# Patient Record
Sex: MALE | Race: WHITE | Employment: FULL TIME | URBAN - METROPOLITAN AREA
[De-identification: names, ages, dates, MRNs, and addresses within clinical notes are randomized per-mention and may not be internally consistent; named-entity substitution may affect disease eponyms.]

---

## 2018-01-13 ENCOUNTER — HOSPITAL ENCOUNTER (EMERGENCY)
Age: 28
Discharge: HOME | End: 2018-01-13
Attending: EMERGENCY MEDICINE
Payer: COMMERCIAL

## 2018-01-13 VITALS
SYSTOLIC BLOOD PRESSURE: 159 MMHG | BODY MASS INDEX: 38.32 KG/M2 | TEMPERATURE: 98.4 F | OXYGEN SATURATION: 98 % | RESPIRATION RATE: 12 BRPM | DIASTOLIC BLOOD PRESSURE: 71 MMHG | HEIGHT: 65 IN | HEART RATE: 71 BPM | WEIGHT: 230 LBS

## 2018-01-13 DIAGNOSIS — M43.6 TORTICOLLIS: Primary | ICD-10-CM

## 2018-01-13 PROCEDURE — 6360000002 HC RX W HCPCS: Performed by: PHYSICIAN ASSISTANT

## 2018-01-13 PROCEDURE — 99282 EMERGENCY DEPT VISIT SF MDM: CPT

## 2018-01-13 PROCEDURE — 96372 THER/PROPH/DIAG INJ SC/IM: CPT

## 2018-01-13 RX ORDER — KETOROLAC TROMETHAMINE 30 MG/ML
60 INJECTION, SOLUTION INTRAMUSCULAR; INTRAVENOUS ONCE
Status: COMPLETED | OUTPATIENT
Start: 2018-01-13 | End: 2018-01-13

## 2018-01-13 RX ORDER — ORPHENADRINE CITRATE 30 MG/ML
60 INJECTION INTRAMUSCULAR; INTRAVENOUS ONCE
Status: COMPLETED | OUTPATIENT
Start: 2018-01-13 | End: 2018-01-13

## 2018-01-13 RX ORDER — METHYLPREDNISOLONE ACETATE 80 MG/ML
80 INJECTION, SUSPENSION INTRA-ARTICULAR; INTRALESIONAL; INTRAMUSCULAR; SOFT TISSUE ONCE
Status: COMPLETED | OUTPATIENT
Start: 2018-01-13 | End: 2018-01-13

## 2018-01-13 RX ORDER — DIAZEPAM 5 MG/1
5 TABLET ORAL EVERY 8 HOURS PRN
Qty: 10 TABLET | Refills: 0 | Status: SHIPPED | OUTPATIENT
Start: 2018-01-13 | End: 2018-01-16

## 2018-01-13 RX ORDER — ETODOLAC 400 MG/1
400 TABLET, FILM COATED ORAL 2 TIMES DAILY
Qty: 14 TABLET | Refills: 0 | Status: SHIPPED | OUTPATIENT
Start: 2018-01-13 | End: 2019-04-28

## 2018-01-13 RX ADMIN — KETOROLAC TROMETHAMINE 60 MG: 30 INJECTION, SOLUTION INTRAMUSCULAR at 19:20

## 2018-01-13 RX ADMIN — ORPHENADRINE CITRATE 60 MG: 30 INJECTION INTRAMUSCULAR; INTRAVENOUS at 19:19

## 2018-01-13 RX ADMIN — METHYLPREDNISOLONE ACETATE 80 MG: 80 INJECTION, SUSPENSION INTRA-ARTICULAR; INTRALESIONAL; INTRAMUSCULAR; SOFT TISSUE at 19:20

## 2018-01-13 ASSESSMENT — ENCOUNTER SYMPTOMS
ALLERGIC/IMMUNOLOGIC NEGATIVE: 1
SHORTNESS OF BREATH: 0
TROUBLE SWALLOWING: 0
ABDOMINAL PAIN: 0
COLOR CHANGE: 0
EYE PAIN: 0
APNEA: 0

## 2018-01-13 ASSESSMENT — PAIN DESCRIPTION - FREQUENCY: FREQUENCY: CONTINUOUS

## 2018-01-13 ASSESSMENT — PAIN SCALES - GENERAL
PAINLEVEL_OUTOF10: 7
PAINLEVEL_OUTOF10: 7

## 2018-01-13 ASSESSMENT — PAIN DESCRIPTION - LOCATION: LOCATION: NECK

## 2018-01-13 ASSESSMENT — PAIN DESCRIPTION - ORIENTATION: ORIENTATION: LEFT

## 2018-01-13 ASSESSMENT — PAIN DESCRIPTION - DESCRIPTORS: DESCRIPTORS: BURNING;SQUEEZING;THROBBING

## 2018-01-13 ASSESSMENT — PAIN DESCRIPTION - ONSET: ONSET: AWAKENED FROM SLEEP

## 2018-01-13 ASSESSMENT — PAIN DESCRIPTION - PROGRESSION: CLINICAL_PROGRESSION: NOT CHANGED

## 2018-01-13 ASSESSMENT — PAIN DESCRIPTION - PAIN TYPE: TYPE: ACUTE PAIN

## 2018-01-14 NOTE — ED PROVIDER NOTES
[address] [facility] ED  eMERGENCY dEPARTMENT eNCOUnter      Pt Name: Marbella Paez  MRN: [id number]  [place] 1990  Date of evaluation: 1/13/2018  Provider: Luc Mendez PA-C    CHIEF COMPLAINT       Chief Complaint   Patient presents with    Neck Pain     pt woke up with left neck pain 2 days ago that radiates down left arm and to left shoulder blade. No injury         HISTORY OF PRESENT ILLNESS  (Location/Symptom, Timing/Onset, Context/Setting, Quality, Duration, Modifying Factors, Severity.)   Capri Moura II is a 32 y.o. male who presents to the emergency department With complaints of left-sided neck pain, shoulder and trapezius pain that began after sleeping wrong on it 2 days ago. Patient sits with his head cocked slightly to the right and states that he has pain when he turns towards the ipsilateral side on the left. Patient denies any numbness or tingling. Patient denies any chest pain or shortness of breath. Patient denies any headaches, dizziness, lightheadedness, change or loss of vision or hearing. Patient denies any abdominal pain, nausea vomiting or diarrhea. Patient denies any fevers constitutional symptoms otherwise. Patient denies any falls or blunt trauma. HPI    Nursing Notes were reviewed and I agree. REVIEW OF SYSTEMS    (2-9 systems for level 4, 10 or more for level 5)     Review of Systems   Constitutional: Negative for diaphoresis and fever. HENT: Negative for hearing loss and trouble swallowing. Eyes: Negative for pain. Respiratory: Negative for apnea and shortness of breath. Cardiovascular: Negative for chest pain. Gastrointestinal: Negative for abdominal pain. Endocrine: Negative. Genitourinary: Negative for hematuria. Musculoskeletal: Positive for neck pain and neck stiffness. Skin: Negative for color change. Allergic/Immunologic: Negative. Neurological: Negative for dizziness and numbness. Hematological: Negative. in 2 days        DISCHARGE MEDICATIONS:  New Prescriptions    DIAZEPAM (VALIUM) 5 MG TABLET    Take 1 tablet by mouth every 8 hours as needed (muscle spasm) for up to 3 days.     ETODOLAC (LODINE) 400 MG TABLET    Take 1 tablet by mouth 2 times daily       (Please note that portions of this note were completed with a voice recognition program.  Efforts were made to edit the dictations but occasionally words are mis-transcribed.)    LAUREANO Camilo PA-C  01/13/18 1919

## 2018-03-30 ENCOUNTER — HOSPITAL ENCOUNTER (EMERGENCY)
Age: 28
Discharge: HOME | End: 2018-03-30

## 2018-03-30 VITALS
OXYGEN SATURATION: 99 % | RESPIRATION RATE: 18 BRPM | HEIGHT: 65 IN | HEART RATE: 82 BPM | DIASTOLIC BLOOD PRESSURE: 84 MMHG | TEMPERATURE: 98.8 F | WEIGHT: 230 LBS | SYSTOLIC BLOOD PRESSURE: 142 MMHG | BODY MASS INDEX: 38.32 KG/M2

## 2018-03-30 DIAGNOSIS — H00.014 HORDEOLUM EXTERNUM OF LEFT UPPER EYELID: Primary | ICD-10-CM

## 2018-03-30 PROCEDURE — 99282 EMERGENCY DEPT VISIT SF MDM: CPT

## 2018-03-30 RX ORDER — CEPHALEXIN 500 MG/1
500 CAPSULE ORAL 2 TIMES DAILY
Qty: 14 CAPSULE | Refills: 0 | Status: SHIPPED | OUTPATIENT
Start: 2018-03-30 | End: 2018-04-06

## 2018-03-30 RX ORDER — LISINOPRIL 20 MG/1
5 TABLET ORAL DAILY
COMMUNITY
End: 2019-01-17

## 2018-03-30 ASSESSMENT — PAIN DESCRIPTION - ORIENTATION: ORIENTATION: LEFT

## 2018-03-30 ASSESSMENT — ENCOUNTER SYMPTOMS
ABDOMINAL PAIN: 0
DIARRHEA: 0
COUGH: 0
VOMITING: 0
SHORTNESS OF BREATH: 0
NAUSEA: 0

## 2018-03-30 ASSESSMENT — PAIN DESCRIPTION - LOCATION: LOCATION: EYE;FACE

## 2018-03-30 ASSESSMENT — PAIN DESCRIPTION - PAIN TYPE: TYPE: ACUTE PAIN

## 2018-10-20 ENCOUNTER — HOSPITAL ENCOUNTER (EMERGENCY)
Age: 28
Discharge: HOME | End: 2018-10-20
Attending: STUDENT IN AN ORGANIZED HEALTH CARE EDUCATION/TRAINING PROGRAM
Payer: COMMERCIAL

## 2018-10-20 ENCOUNTER — APPOINTMENT (OUTPATIENT)
Dept: GENERAL RADIOLOGY | Age: 28
End: 2018-10-20
Payer: COMMERCIAL

## 2018-10-20 VITALS
HEART RATE: 90 BPM | RESPIRATION RATE: 14 BRPM | WEIGHT: 215 LBS | DIASTOLIC BLOOD PRESSURE: 68 MMHG | SYSTOLIC BLOOD PRESSURE: 135 MMHG | TEMPERATURE: 99 F | BODY MASS INDEX: 35.78 KG/M2 | OXYGEN SATURATION: 99 %

## 2018-10-20 DIAGNOSIS — R07.9 CHEST PAIN, UNSPECIFIED TYPE: Primary | ICD-10-CM

## 2018-10-20 LAB
ALBUMIN SERPL-MCNC: 4.5 G/DL (ref 3.9–4.9)
ALP BLD-CCNC: 61 U/L (ref 35–104)
ALT SERPL-CCNC: 17 U/L (ref 0–41)
AMPHETAMINE SCREEN, URINE: NORMAL
ANION GAP SERPL CALCULATED.3IONS-SCNC: 14 MEQ/L (ref 7–13)
AST SERPL-CCNC: 16 U/L (ref 0–40)
BARBITURATE SCREEN URINE: NORMAL
BASOPHILS ABSOLUTE: 0 K/UL (ref 0–0.2)
BASOPHILS RELATIVE PERCENT: 0.4 %
BENZODIAZEPINE SCREEN, URINE: NORMAL
BILIRUB SERPL-MCNC: 0.3 MG/DL (ref 0–1.2)
BUN BLDV-MCNC: 13 MG/DL (ref 6–20)
CALCIUM SERPL-MCNC: 9.2 MG/DL (ref 8.6–10.2)
CANNABINOID SCREEN URINE: NORMAL
CHLORIDE BLD-SCNC: 100 MEQ/L (ref 98–107)
CO2: 23 MEQ/L (ref 22–29)
COCAINE METABOLITE SCREEN URINE: NORMAL
CREAT SERPL-MCNC: 0.84 MG/DL (ref 0.7–1.2)
EKG ATRIAL RATE: 98 BPM
EKG P AXIS: 40 DEGREES
EKG P-R INTERVAL: 142 MS
EKG Q-T INTERVAL: 360 MS
EKG QRS DURATION: 110 MS
EKG QTC CALCULATION (BAZETT): 459 MS
EKG R AXIS: 34 DEGREES
EKG T AXIS: 14 DEGREES
EKG VENTRICULAR RATE: 98 BPM
EOSINOPHILS ABSOLUTE: 0.3 K/UL (ref 0–0.7)
EOSINOPHILS RELATIVE PERCENT: 2.6 %
ETHANOL PERCENT: NORMAL G/DL
ETHANOL: <10 MG/DL (ref 0–0.08)
GFR AFRICAN AMERICAN: >60
GFR NON-AFRICAN AMERICAN: >60
GLOBULIN: 3.7 G/DL (ref 2.3–3.5)
GLUCOSE BLD-MCNC: 132 MG/DL (ref 74–109)
HCT VFR BLD CALC: 45.9 % (ref 42–52)
HEMOGLOBIN: 15.2 G/DL (ref 14–18)
LYMPHOCYTES ABSOLUTE: 2.6 K/UL (ref 1–4.8)
LYMPHOCYTES RELATIVE PERCENT: 24.5 %
Lab: NORMAL
MCH RBC QN AUTO: 27 PG (ref 27–31.3)
MCHC RBC AUTO-ENTMCNC: 33.1 % (ref 33–37)
MCV RBC AUTO: 81.4 FL (ref 80–100)
MONOCYTES ABSOLUTE: 1.2 K/UL (ref 0.2–0.8)
MONOCYTES RELATIVE PERCENT: 11.6 %
NEUTROPHILS ABSOLUTE: 6.5 K/UL (ref 1.4–6.5)
NEUTROPHILS RELATIVE PERCENT: 60.9 %
OPIATE SCREEN URINE: NORMAL
PDW BLD-RTO: 13.7 % (ref 11.5–14.5)
PHENCYCLIDINE SCREEN URINE: NORMAL
PLATELET # BLD: 330 K/UL (ref 130–400)
POTASSIUM SERPL-SCNC: 3.5 MEQ/L (ref 3.5–5.1)
RBC # BLD: 5.64 M/UL (ref 4.7–6.1)
SODIUM BLD-SCNC: 137 MEQ/L (ref 132–144)
TOTAL CK: 135 U/L (ref 0–190)
TOTAL PROTEIN: 8.2 G/DL (ref 6.4–8.1)
TROPONIN: <0.01 NG/ML (ref 0–0.01)
WBC # BLD: 10.7 K/UL (ref 4.8–10.8)

## 2018-10-20 PROCEDURE — G0480 DRUG TEST DEF 1-7 CLASSES: HCPCS

## 2018-10-20 PROCEDURE — 82550 ASSAY OF CK (CPK): CPT

## 2018-10-20 PROCEDURE — 80053 COMPREHEN METABOLIC PANEL: CPT

## 2018-10-20 PROCEDURE — 99285 EMERGENCY DEPT VISIT HI MDM: CPT

## 2018-10-20 PROCEDURE — 85025 COMPLETE CBC W/AUTO DIFF WBC: CPT

## 2018-10-20 PROCEDURE — 84484 ASSAY OF TROPONIN QUANT: CPT

## 2018-10-20 PROCEDURE — 71045 X-RAY EXAM CHEST 1 VIEW: CPT

## 2018-10-20 PROCEDURE — 80307 DRUG TEST PRSMV CHEM ANLYZR: CPT

## 2018-10-20 PROCEDURE — 93005 ELECTROCARDIOGRAM TRACING: CPT

## 2018-10-20 PROCEDURE — 36415 COLL VENOUS BLD VENIPUNCTURE: CPT

## 2018-10-20 ASSESSMENT — ENCOUNTER SYMPTOMS
CONSTIPATION: 0
VOMITING: 0
ABDOMINAL PAIN: 0
COLOR CHANGE: 0
SHORTNESS OF BREATH: 0
RHINORRHEA: 0
EYE DISCHARGE: 0
SORE THROAT: 0
DIARRHEA: 0
ABDOMINAL DISTENTION: 0
NAUSEA: 0

## 2018-10-20 NOTE — ED PROVIDER NOTES
[address] [facility] ED  eMERGENCY dEPARTMENT eNCOUnter      Pt Name: Hannah Elliott  MRN: [id number]  [place] 1990  Date of evaluation: 10/20/2018  Provider: Benja Lennon PA-C    CHIEF COMPLAINT       Chief Complaint   Patient presents with    Chest Pain         HISTORY OF PRESENT ILLNESS   (Location/Symptom, Timing/Onset,Context/Setting, Quality, Duration, Modifying Factors, Severity)  Note limiting factors. Hannah Elliott is a 29 y.o. male who presents to the emergency department with a complaint of midsternal chest pain which patient states started approximately 1 hour ago while he was shaving. He denies any shortness of breath no nausea vomiting no dizziness with associated pain states initially as a 3 out of 10 and felt like a heaviness on his chest but states prior to calling EMS his symptoms did resolve. Patient states past history of coronary artery disease with aortic stenosis which was repaired as a child she also has a history of hypertension and diabetes states that he's been off his medications for over one year due to lack of insurance. He states he does not know what his blood sugars are doing as he does not ever checked out. He states he is currently pain-free at this time 0 out of 10. HPI    NursingNotes were reviewed. REVIEW OF SYSTEMS    (2-9 systems for level 4, 10 or more for level 5)     Review of Systems   Constitutional: Negative for activity change and appetite change. HENT: Negative for congestion, ear discharge, ear pain, nosebleeds, rhinorrhea and sore throat. Eyes: Negative for discharge. Respiratory: Negative for shortness of breath. Cardiovascular: Positive for chest pain. Negative for palpitations and leg swelling. Gastrointestinal: Negative for abdominal distention, abdominal pain, constipation, diarrhea, nausea and vomiting. Genitourinary: Negative for difficulty urinating, dysuria, frequency and urgency.    Musculoskeletal: Negative for arthralgias. Skin: Negative for color change, pallor and rash. Neurological: Negative for dizziness, tremors, syncope, weakness, numbness and headaches. Psychiatric/Behavioral: Negative for agitation and confusion. Except as noted above the remainder of the review of systems was reviewed and negative. PAST MEDICAL HISTORY     Past Medical History:   Diagnosis Date    CAD (coronary artery disease)     Diabetes mellitus ([facility]     Hypertension          SURGICALHISTORY       Past Surgical History:   Procedure Laterality Date    AORTA SURGERY      x3         CURRENT MEDICATIONS       Discharge Medication List as of 10/20/2018  3:46 PM      CONTINUE these medications which have NOT CHANGED    Details   lisinopril (PRINIVIL;ZESTRIL) 20 MG tablet Take 20 mg by mouth dailyHistorical Med      metFORMIN (GLUCOPHAGE) 500 MG tablet Take 500 mg by mouth daily (with breakfast)Historical Med      etodolac (LODINE) 400 MG tablet Take 1 tablet by mouth 2 times daily, Disp-14 tablet, R-0Print      NITROGLYCERIN SL Place  under the tongue as needed. Pantoprazole Sodium (PROTONIX PO) Take  by mouth. ALLERGIES     Patient has no known allergies. FAMILY HISTORY     History reviewed. No pertinent family history.        SOCIAL HISTORY       Social History     Social History    Marital status: Single     Spouse name: N/A    Number of children: N/A    Years of education: N/A     Social History Main Topics    Smoking status: Never Smoker    Smokeless tobacco: Never Used    Alcohol use No    Drug use: No    Sexual activity: Not Asked     Other Topics Concern    None     Social History Narrative    None       SCREENINGS      @FLOW([id number])@      PHYSICAL EXAM    (up to 7 for level 4, 8 or more for level 5)     ED Triage Vitals [10/20/18 1353]   BP Temp Temp Source Pulse Resp SpO2 Height Weight   (!) 144/72 99 °F (37.2 °C) Oral 106 22 99 % -- 215 lb (97.5 kg)       Physical Exam

## 2018-10-22 PROCEDURE — 93010 ELECTROCARDIOGRAM REPORT: CPT | Performed by: INTERNAL MEDICINE

## 2018-10-25 ASSESSMENT — HEART SCORE: ECG: 1

## 2019-01-17 ENCOUNTER — HOSPITAL ENCOUNTER (EMERGENCY)
Age: 29
Discharge: HOME | End: 2019-01-17
Payer: MEDICAID

## 2019-01-17 VITALS
HEART RATE: 81 BPM | RESPIRATION RATE: 19 BRPM | SYSTOLIC BLOOD PRESSURE: 132 MMHG | WEIGHT: 206 LBS | HEIGHT: 66 IN | DIASTOLIC BLOOD PRESSURE: 61 MMHG | TEMPERATURE: 98.6 F | BODY MASS INDEX: 33.11 KG/M2 | OXYGEN SATURATION: 99 %

## 2019-01-17 DIAGNOSIS — H60.393 INFECTIVE OTITIS EXTERNA OF BOTH EARS: ICD-10-CM

## 2019-01-17 DIAGNOSIS — Z76.0 ENCOUNTER FOR MEDICATION REFILL: ICD-10-CM

## 2019-01-17 DIAGNOSIS — H81.10 BENIGN PAROXYSMAL POSITIONAL VERTIGO, UNSPECIFIED LATERALITY: Primary | ICD-10-CM

## 2019-01-17 DIAGNOSIS — H61.23 BILATERAL IMPACTED CERUMEN: ICD-10-CM

## 2019-01-17 LAB
ALBUMIN SERPL-MCNC: 4.6 G/DL (ref 3.9–4.9)
ALP BLD-CCNC: 71 U/L (ref 35–104)
ALT SERPL-CCNC: 14 U/L (ref 0–41)
ANION GAP SERPL CALCULATED.3IONS-SCNC: 13 MEQ/L (ref 7–13)
AST SERPL-CCNC: 15 U/L (ref 0–40)
BASOPHILS ABSOLUTE: 0.1 K/UL (ref 0–0.2)
BASOPHILS RELATIVE PERCENT: 0.6 %
BILIRUB SERPL-MCNC: <0.2 MG/DL (ref 0–1.2)
BUN BLDV-MCNC: 17 MG/DL (ref 6–20)
CALCIUM SERPL-MCNC: 9.6 MG/DL (ref 8.6–10.2)
CHLORIDE BLD-SCNC: 101 MEQ/L (ref 98–107)
CO2: 25 MEQ/L (ref 22–29)
CREAT SERPL-MCNC: 0.84 MG/DL (ref 0.7–1.2)
EKG ATRIAL RATE: 77 BPM
EKG P AXIS: 32 DEGREES
EKG P-R INTERVAL: 150 MS
EKG Q-T INTERVAL: 380 MS
EKG QRS DURATION: 106 MS
EKG QTC CALCULATION (BAZETT): 430 MS
EKG R AXIS: 30 DEGREES
EKG T AXIS: 30 DEGREES
EKG VENTRICULAR RATE: 77 BPM
EOSINOPHILS ABSOLUTE: 0.5 K/UL (ref 0–0.7)
EOSINOPHILS RELATIVE PERCENT: 3.3 %
GFR AFRICAN AMERICAN: >60
GFR NON-AFRICAN AMERICAN: >60
GLOBULIN: 3.8 G/DL (ref 2.3–3.5)
GLUCOSE BLD-MCNC: 108 MG/DL (ref 74–109)
GLUCOSE BLD-MCNC: 120 MG/DL (ref 60–115)
HCT VFR BLD CALC: 46.5 % (ref 42–52)
HEMOGLOBIN: 15.8 G/DL (ref 14–18)
LYMPHOCYTES ABSOLUTE: 3.4 K/UL (ref 1–4.8)
LYMPHOCYTES RELATIVE PERCENT: 22.8 %
MCH RBC QN AUTO: 27.3 PG (ref 27–31.3)
MCHC RBC AUTO-ENTMCNC: 34 % (ref 33–37)
MCV RBC AUTO: 80.4 FL (ref 80–100)
MONOCYTES ABSOLUTE: 1.4 K/UL (ref 0.2–0.8)
MONOCYTES RELATIVE PERCENT: 9.1 %
NEUTROPHILS ABSOLUTE: 9.6 K/UL (ref 1.4–6.5)
NEUTROPHILS RELATIVE PERCENT: 64.2 %
PDW BLD-RTO: 14.7 % (ref 11.5–14.5)
PERFORMED ON: ABNORMAL
PLATELET # BLD: 331 K/UL (ref 130–400)
POTASSIUM SERPL-SCNC: 4.1 MEQ/L (ref 3.5–5.1)
RBC # BLD: 5.78 M/UL (ref 4.7–6.1)
SODIUM BLD-SCNC: 139 MEQ/L (ref 132–144)
TOTAL PROTEIN: 8.4 G/DL (ref 6.4–8.1)
WBC # BLD: 14.9 K/UL (ref 4.8–10.8)

## 2019-01-17 PROCEDURE — 99284 EMERGENCY DEPT VISIT MOD MDM: CPT

## 2019-01-17 PROCEDURE — 93005 ELECTROCARDIOGRAM TRACING: CPT

## 2019-01-17 PROCEDURE — 2580000003 HC RX 258: Performed by: NURSE PRACTITIONER

## 2019-01-17 PROCEDURE — 85025 COMPLETE CBC W/AUTO DIFF WBC: CPT

## 2019-01-17 PROCEDURE — 6370000000 HC RX 637 (ALT 250 FOR IP): Performed by: NURSE PRACTITIONER

## 2019-01-17 PROCEDURE — 36415 COLL VENOUS BLD VENIPUNCTURE: CPT

## 2019-01-17 PROCEDURE — 80053 COMPREHEN METABOLIC PANEL: CPT

## 2019-01-17 RX ORDER — OFLOXACIN 3 MG/ML
10 SOLUTION AURICULAR (OTIC) 2 TIMES DAILY
Qty: 1 BOTTLE | Refills: 0 | Status: SHIPPED | OUTPATIENT
Start: 2019-01-17 | End: 2019-01-27

## 2019-01-17 RX ORDER — 0.9 % SODIUM CHLORIDE 0.9 %
1000 INTRAVENOUS SOLUTION INTRAVENOUS ONCE
Status: COMPLETED | OUTPATIENT
Start: 2019-01-17 | End: 2019-01-17

## 2019-01-17 RX ORDER — LISINOPRIL 5 MG/1
5 TABLET ORAL DAILY
Qty: 30 TABLET | Refills: 0 | Status: SHIPPED | OUTPATIENT
Start: 2019-01-17 | End: 2019-06-13 | Stop reason: SDUPTHER

## 2019-01-17 RX ORDER — ATORVASTATIN CALCIUM 20 MG/1
20 TABLET, FILM COATED ORAL DAILY
Qty: 30 TABLET | Refills: 0 | Status: SHIPPED | OUTPATIENT
Start: 2019-01-17 | End: 2019-06-13 | Stop reason: SDUPTHER

## 2019-01-17 RX ORDER — MECLIZINE HYDROCHLORIDE 25 MG/1
25 TABLET ORAL EVERY 8 HOURS PRN
Qty: 15 TABLET | Refills: 0 | Status: SHIPPED | OUTPATIENT
Start: 2019-01-17 | End: 2019-01-22

## 2019-01-17 RX ORDER — MECLIZINE HYDROCHLORIDE 25 MG/1
25 TABLET ORAL ONCE
Status: COMPLETED | OUTPATIENT
Start: 2019-01-17 | End: 2019-01-17

## 2019-01-17 RX ADMIN — MECLIZINE HYDROCHLORIDE 25 MG: 25 TABLET ORAL at 21:12

## 2019-01-17 RX ADMIN — SODIUM CHLORIDE 1000 ML: 9 INJECTION, SOLUTION INTRAVENOUS at 20:28

## 2019-01-17 ASSESSMENT — ENCOUNTER SYMPTOMS
TROUBLE SWALLOWING: 0
SORE THROAT: 0
BACK PAIN: 0
WHEEZING: 0
ABDOMINAL PAIN: 0
NAUSEA: 0
VOMITING: 0
DIARRHEA: 0
SHORTNESS OF BREATH: 0
EYE ITCHING: 0
VOICE CHANGE: 0
RHINORRHEA: 0
EYE PAIN: 0
COLOR CHANGE: 0
COUGH: 0
EYE REDNESS: 0

## 2019-01-30 ENCOUNTER — HOSPITAL ENCOUNTER (EMERGENCY)
Age: 29
Discharge: HOME | End: 2019-01-30
Payer: MEDICAID

## 2019-01-30 VITALS
OXYGEN SATURATION: 100 % | WEIGHT: 206 LBS | HEART RATE: 67 BPM | BODY MASS INDEX: 34.32 KG/M2 | RESPIRATION RATE: 16 BRPM | SYSTOLIC BLOOD PRESSURE: 109 MMHG | TEMPERATURE: 98.2 F | HEIGHT: 65 IN | DIASTOLIC BLOOD PRESSURE: 61 MMHG

## 2019-01-30 DIAGNOSIS — R51.9 NONINTRACTABLE HEADACHE, UNSPECIFIED CHRONICITY PATTERN, UNSPECIFIED HEADACHE TYPE: Primary | ICD-10-CM

## 2019-01-30 LAB
ANION GAP SERPL CALCULATED.3IONS-SCNC: 10 MEQ/L (ref 7–13)
BASOPHILS ABSOLUTE: 0.1 K/UL (ref 0–0.2)
BASOPHILS RELATIVE PERCENT: 0.6 %
BUN BLDV-MCNC: 10 MG/DL (ref 6–20)
CALCIUM SERPL-MCNC: 9.2 MG/DL (ref 8.6–10.2)
CHLORIDE BLD-SCNC: 101 MEQ/L (ref 98–107)
CO2: 29 MEQ/L (ref 22–29)
CREAT SERPL-MCNC: 0.9 MG/DL (ref 0.7–1.2)
EOSINOPHILS ABSOLUTE: 0.2 K/UL (ref 0–0.7)
EOSINOPHILS RELATIVE PERCENT: 1.9 %
GFR AFRICAN AMERICAN: >60
GFR NON-AFRICAN AMERICAN: >60
GLUCOSE BLD-MCNC: 141 MG/DL (ref 74–109)
HCT VFR BLD CALC: 43.1 % (ref 42–52)
HEMOGLOBIN: 15.1 G/DL (ref 14–18)
LYMPHOCYTES ABSOLUTE: 2.2 K/UL (ref 1–4.8)
LYMPHOCYTES RELATIVE PERCENT: 21.8 %
MCH RBC QN AUTO: 28 PG (ref 27–31.3)
MCHC RBC AUTO-ENTMCNC: 35.1 % (ref 33–37)
MCV RBC AUTO: 79.8 FL (ref 80–100)
MONOCYTES ABSOLUTE: 1 K/UL (ref 0.2–0.8)
MONOCYTES RELATIVE PERCENT: 9.9 %
NEUTROPHILS ABSOLUTE: 6.5 K/UL (ref 1.4–6.5)
NEUTROPHILS RELATIVE PERCENT: 65.8 %
PDW BLD-RTO: 14.6 % (ref 11.5–14.5)
PLATELET # BLD: 296 K/UL (ref 130–400)
POTASSIUM SERPL-SCNC: 4.1 MEQ/L (ref 3.5–5.1)
RBC # BLD: 5.4 M/UL (ref 4.7–6.1)
SODIUM BLD-SCNC: 140 MEQ/L (ref 132–144)
WBC # BLD: 9.9 K/UL (ref 4.8–10.8)

## 2019-01-30 PROCEDURE — 96374 THER/PROPH/DIAG INJ IV PUSH: CPT

## 2019-01-30 PROCEDURE — 6360000002 HC RX W HCPCS: Performed by: NURSE PRACTITIONER

## 2019-01-30 PROCEDURE — 2580000003 HC RX 258: Performed by: NURSE PRACTITIONER

## 2019-01-30 PROCEDURE — 85025 COMPLETE CBC W/AUTO DIFF WBC: CPT

## 2019-01-30 PROCEDURE — 96375 TX/PRO/DX INJ NEW DRUG ADDON: CPT

## 2019-01-30 PROCEDURE — 80048 BASIC METABOLIC PNL TOTAL CA: CPT

## 2019-01-30 PROCEDURE — 36415 COLL VENOUS BLD VENIPUNCTURE: CPT

## 2019-01-30 PROCEDURE — 99284 EMERGENCY DEPT VISIT MOD MDM: CPT

## 2019-01-30 RX ORDER — METOCLOPRAMIDE HYDROCHLORIDE 5 MG/ML
10 INJECTION INTRAMUSCULAR; INTRAVENOUS ONCE
Status: COMPLETED | OUTPATIENT
Start: 2019-01-30 | End: 2019-01-30

## 2019-01-30 RX ORDER — BUTALBITAL, ACETAMINOPHEN AND CAFFEINE 300; 40; 50 MG/1; MG/1; MG/1
1 CAPSULE ORAL EVERY 4 HOURS PRN
Qty: 15 CAPSULE | Refills: 0 | Status: SHIPPED | OUTPATIENT
Start: 2019-01-30 | End: 2019-06-13 | Stop reason: ALTCHOICE

## 2019-01-30 RX ORDER — 0.9 % SODIUM CHLORIDE 0.9 %
1000 INTRAVENOUS SOLUTION INTRAVENOUS ONCE
Status: COMPLETED | OUTPATIENT
Start: 2019-01-30 | End: 2019-01-30

## 2019-01-30 RX ORDER — DIPHENHYDRAMINE HYDROCHLORIDE 50 MG/ML
25 INJECTION INTRAMUSCULAR; INTRAVENOUS ONCE
Status: COMPLETED | OUTPATIENT
Start: 2019-01-30 | End: 2019-01-30

## 2019-01-30 RX ADMIN — METOCLOPRAMIDE 10 MG: 5 INJECTION, SOLUTION INTRAMUSCULAR; INTRAVENOUS at 13:12

## 2019-01-30 RX ADMIN — SODIUM CHLORIDE 1000 ML: 9 INJECTION, SOLUTION INTRAVENOUS at 13:15

## 2019-01-30 RX ADMIN — DIPHENHYDRAMINE HYDROCHLORIDE 25 MG: 50 INJECTION, SOLUTION INTRAMUSCULAR; INTRAVENOUS at 13:12

## 2019-01-30 ASSESSMENT — PAIN DESCRIPTION - ONSET: ONSET: PROGRESSIVE

## 2019-01-30 ASSESSMENT — PAIN DESCRIPTION - DESCRIPTORS
DESCRIPTORS: DISCOMFORT
DESCRIPTORS: SQUEEZING;SHARP;STABBING

## 2019-01-30 ASSESSMENT — ENCOUNTER SYMPTOMS
BACK PAIN: 0
SHORTNESS OF BREATH: 0
RHINORRHEA: 0
NAUSEA: 0
PHOTOPHOBIA: 0
EYE PAIN: 0
ABDOMINAL PAIN: 0
SORE THROAT: 0
DIARRHEA: 0
VOMITING: 0
COUGH: 0

## 2019-01-30 ASSESSMENT — PAIN DESCRIPTION - LOCATION
LOCATION: HEAD
LOCATION: HEAD

## 2019-01-30 ASSESSMENT — PAIN DESCRIPTION - FREQUENCY: FREQUENCY: CONTINUOUS

## 2019-01-30 ASSESSMENT — PAIN DESCRIPTION - PROGRESSION: CLINICAL_PROGRESSION: GRADUALLY IMPROVING

## 2019-01-30 ASSESSMENT — PAIN DESCRIPTION - PAIN TYPE: TYPE: ACUTE PAIN

## 2019-01-30 ASSESSMENT — PAIN SCALES - GENERAL
PAINLEVEL_OUTOF10: 2
PAINLEVEL_OUTOF10: 10

## 2019-04-28 ENCOUNTER — APPOINTMENT (OUTPATIENT)
Dept: GENERAL RADIOLOGY | Age: 29
End: 2019-04-28
Payer: MEDICAID

## 2019-04-28 ENCOUNTER — HOSPITAL ENCOUNTER (EMERGENCY)
Age: 29
Discharge: HOME | End: 2019-04-28
Payer: MEDICAID

## 2019-04-28 VITALS
DIASTOLIC BLOOD PRESSURE: 63 MMHG | BODY MASS INDEX: 32.65 KG/M2 | OXYGEN SATURATION: 98 % | RESPIRATION RATE: 18 BRPM | HEART RATE: 90 BPM | HEIGHT: 65 IN | WEIGHT: 196 LBS | SYSTOLIC BLOOD PRESSURE: 133 MMHG | TEMPERATURE: 98.7 F

## 2019-04-28 DIAGNOSIS — S40.012A CONTUSION OF LEFT SHOULDER, INITIAL ENCOUNTER: Primary | ICD-10-CM

## 2019-04-28 PROCEDURE — 99283 EMERGENCY DEPT VISIT LOW MDM: CPT

## 2019-04-28 PROCEDURE — 6370000000 HC RX 637 (ALT 250 FOR IP): Performed by: PHYSICIAN ASSISTANT

## 2019-04-28 PROCEDURE — 73030 X-RAY EXAM OF SHOULDER: CPT

## 2019-04-28 RX ORDER — NAPROXEN 250 MG/1
500 TABLET ORAL 2 TIMES DAILY WITH MEALS
Qty: 20 TABLET | Refills: 0 | Status: SHIPPED | OUTPATIENT
Start: 2019-04-28 | End: 2019-06-13 | Stop reason: ALTCHOICE

## 2019-04-28 RX ORDER — NAPROXEN 500 MG/1
500 TABLET ORAL ONCE
Status: COMPLETED | OUTPATIENT
Start: 2019-04-28 | End: 2019-04-28

## 2019-04-28 RX ORDER — CYCLOBENZAPRINE HCL 10 MG
10 TABLET ORAL 3 TIMES DAILY PRN
Qty: 15 TABLET | Refills: 0 | Status: SHIPPED | OUTPATIENT
Start: 2019-04-28 | End: 2019-05-08

## 2019-04-28 RX ADMIN — NAPROXEN 500 MG: 500 TABLET ORAL at 15:43

## 2019-04-28 ASSESSMENT — ENCOUNTER SYMPTOMS
GASTROINTESTINAL NEGATIVE: 1
RESPIRATORY NEGATIVE: 1
EYES NEGATIVE: 1

## 2019-04-28 ASSESSMENT — PAIN SCALES - GENERAL
PAINLEVEL_OUTOF10: 10

## 2019-04-28 ASSESSMENT — PAIN DESCRIPTION - PAIN TYPE
TYPE: ACUTE PAIN
TYPE: ACUTE PAIN

## 2019-04-28 ASSESSMENT — PAIN DESCRIPTION - ORIENTATION
ORIENTATION: LEFT
ORIENTATION: LEFT

## 2019-04-28 ASSESSMENT — PAIN DESCRIPTION - PROGRESSION: CLINICAL_PROGRESSION: GRADUALLY WORSENING

## 2019-04-28 ASSESSMENT — PAIN DESCRIPTION - LOCATION
LOCATION: SHOULDER
LOCATION: SHOULDER

## 2019-04-28 ASSESSMENT — PAIN DESCRIPTION - DESCRIPTORS: DESCRIPTORS: ACHING;BURNING

## 2019-04-28 NOTE — ED PROVIDER NOTES
[address] [facility] ED  eMERGENCY dEPARTMENT eNCOUnter      Pt Name: Penny Bucio  MRN: [id number]  [place] 1990  Date of evaluation: 4/28/2019  Provider: Devika Hamilton PA-C      HISTORY OF PRESENT ILLNESS    Ryan Rivera II is a 29 y.o. male who presents to the Emergency Department with chief complaint of left shoulder pain. Patient states 2 days ago he tripped backwards over a child's toy and his left shoulder struck a heavy wooden door. Patient states he has pain that worsens when he tries to raise his left arm upward. He denies numbness or tingling. He took ibuprofen yesterday for the pain, but is not taken medicine today. He denies any head or neck injury and has no other concerns at this time. REVIEW OF SYSTEMS       Review of Systems   Constitutional: Negative. HENT: Negative. Eyes: Negative. Respiratory: Negative. Cardiovascular: Negative. Gastrointestinal: Negative. Endocrine: Negative. Genitourinary: Negative. Musculoskeletal: Positive for arthralgias. Left shoulder   Skin: Negative. Neurological: Negative. Psychiatric/Behavioral: Negative.           PAST MEDICAL HISTORY     Past Medical History:   Diagnosis Date    Aortic stenosis     CAD (coronary artery disease)     Diabetes mellitus (HCC)     Hyperlipidemia     Hypertension     PDA (patent ductus arteriosus)     Ulcerative colitis ([facility] [address]          SURGICAL HISTORY       Past Surgical History:   Procedure Laterality Date    AORTA SURGERY      x3    CARDIAC SURGERY      PATENT DUCTUS ARTERIOUS LIGATION           CURRENT MEDICATIONS       Previous Medications    ATORVASTATIN (LIPITOR) 20 MG TABLET    Take 1 tablet by mouth daily    ATORVASTATIN CALCIUM PO    Take 20 mg by mouth    BUTALBITAL-APAP-CAFFEINE (FIORICET) -40 MG CAPS PER CAPSULE    Take 1 capsule by mouth every 4 hours as needed for Headaches    LISINOPRIL (PRINIVIL;ZESTRIL) 5 MG TABLET    Take 1 tablet by mouth daily Pupils are equal, round, and reactive to light. Conjunctivae are normal.   Neck: Normal range of motion. Neck supple. Cardiovascular: Normal rate and regular rhythm. No murmur heard. Pulmonary/Chest: Breath sounds normal. No respiratory distress. He has no wheezes. He has no rales. Abdominal: Soft. He exhibits no distension. There is no tenderness. Musculoskeletal: Normal range of motion. Left shoulder: He exhibits tenderness and bony tenderness. Arms:  Neurological: He is alert and oriented to person, place, and time. No cranial nerve deficit. Skin: Skin is warm and dry. No rash noted. No erythema. Psychiatric: He has a normal mood and affect. Judgment normal.         All other labs were within normal range or not returned as of this dictation. EMERGENCY DEPARTMENT COURSE and DIFFERENTIALDIAGNOSIS/MDM:   Vitals:    Vitals:    04/28/19 1424   BP: 133/63   Pulse: 90   Resp: 18   Temp: 98.7 °F (37.1 °C)   TempSrc: Oral   SpO2: 98%   Weight: 196 lb (88.9 kg)   Height: 5' 5\" (1.651 m)          X-ray of left shoulder reveals no obvious fracture. Patient placed in arm sling for comfort. Patient placed on Naprosyn for pain. Follow-up with orthopedics for re-evaluation and treatment. Return here if symptoms worsen or if new concerning symptoms arise. Patient verbalizes understanding of plan at discharge and has no further questions. PROCEDURES:  Unless otherwise noted below, none     Procedures      FINAL IMPRESSION      1.  Contusion of left shoulder, initial encounter          DISPOSITION/PLAN   DISPOSITION Decision To Discharge 04/28/2019 03:10:44 PM          Adelina Mcclain PA-C (electronically signed)  Attending Emergency Physician  [facility]  04/28/19 3547

## 2019-04-28 NOTE — ED TRIAGE NOTES
Pt states he tripped over some toys and fell into a doorway hurting left arm pt states pain happens only with movement but denies any pain at rest. Pt is a+o x4 msps intact pt appears in no distress at this time no obvious deformity noted

## 2019-06-13 ENCOUNTER — OFFICE VISIT (OUTPATIENT)
Dept: FAMILY MEDICINE CLINIC | Age: 29
End: 2019-06-13
Payer: MEDICAID

## 2019-06-13 VITALS
SYSTOLIC BLOOD PRESSURE: 130 MMHG | TEMPERATURE: 98.2 F | WEIGHT: 205.8 LBS | HEART RATE: 97 BPM | DIASTOLIC BLOOD PRESSURE: 80 MMHG | OXYGEN SATURATION: 98 % | HEIGHT: 66 IN | RESPIRATION RATE: 16 BRPM | BODY MASS INDEX: 33.07 KG/M2

## 2019-06-13 DIAGNOSIS — Q25.0 PDA (PATENT DUCTUS ARTERIOSUS): ICD-10-CM

## 2019-06-13 DIAGNOSIS — E11.59 TYPE 2 DIABETES MELLITUS WITH OTHER CIRCULATORY COMPLICATION, WITHOUT LONG-TERM CURRENT USE OF INSULIN (HCC): Primary | ICD-10-CM

## 2019-06-13 DIAGNOSIS — K51.919 ULCERATIVE COLITIS WITH COMPLICATION, UNSPECIFIED LOCATION (HCC): ICD-10-CM

## 2019-06-13 DIAGNOSIS — E78.2 MIXED HYPERLIPIDEMIA: ICD-10-CM

## 2019-06-13 DIAGNOSIS — R42 VERTIGO: ICD-10-CM

## 2019-06-13 DIAGNOSIS — I25.10 CORONARY ARTERY DISEASE INVOLVING NATIVE CORONARY ARTERY OF NATIVE HEART WITHOUT ANGINA PECTORIS: ICD-10-CM

## 2019-06-13 DIAGNOSIS — I10 ESSENTIAL HYPERTENSION: ICD-10-CM

## 2019-06-13 DIAGNOSIS — E78.5 HYPERLIPIDEMIA, UNSPECIFIED HYPERLIPIDEMIA TYPE: ICD-10-CM

## 2019-06-13 DIAGNOSIS — I10 HYPERTENSION, UNSPECIFIED TYPE: ICD-10-CM

## 2019-06-13 DIAGNOSIS — I35.0 AORTIC VALVE STENOSIS, ETIOLOGY OF CARDIAC VALVE DISEASE UNSPECIFIED: ICD-10-CM

## 2019-06-13 LAB — HBA1C MFR BLD: 9.2 %

## 2019-06-13 PROCEDURE — 99204 OFFICE O/P NEW MOD 45 MIN: CPT | Performed by: FAMILY MEDICINE

## 2019-06-13 PROCEDURE — G8417 CALC BMI ABV UP PARAM F/U: HCPCS | Performed by: FAMILY MEDICINE

## 2019-06-13 PROCEDURE — 83036 HEMOGLOBIN GLYCOSYLATED A1C: CPT | Performed by: FAMILY MEDICINE

## 2019-06-13 PROCEDURE — 3046F HEMOGLOBIN A1C LEVEL >9.0%: CPT | Performed by: FAMILY MEDICINE

## 2019-06-13 PROCEDURE — 1036F TOBACCO NON-USER: CPT | Performed by: FAMILY MEDICINE

## 2019-06-13 PROCEDURE — G8599 NO ASA/ANTIPLAT THER USE RNG: HCPCS | Performed by: FAMILY MEDICINE

## 2019-06-13 PROCEDURE — G8427 DOCREV CUR MEDS BY ELIG CLIN: HCPCS | Performed by: FAMILY MEDICINE

## 2019-06-13 PROCEDURE — 2022F DILAT RTA XM EVC RTNOPTHY: CPT | Performed by: FAMILY MEDICINE

## 2019-06-13 RX ORDER — LISINOPRIL 10 MG/1
10 TABLET ORAL DAILY
Qty: 30 TABLET | Refills: 5 | Status: SHIPPED | OUTPATIENT
Start: 2019-06-13

## 2019-06-13 RX ORDER — ATORVASTATIN CALCIUM 20 MG/1
20 TABLET, FILM COATED ORAL DAILY
Qty: 30 TABLET | Refills: 5 | Status: SHIPPED | OUTPATIENT
Start: 2019-06-13 | End: 2020-01-01

## 2019-06-13 RX ORDER — MECLIZINE HYDROCHLORIDE 25 MG/1
25 TABLET ORAL EVERY 8 HOURS PRN
Qty: 120 TABLET | Refills: 5 | Status: SHIPPED | OUTPATIENT
Start: 2019-06-13

## 2019-06-13 RX ORDER — MECLIZINE HYDROCHLORIDE 25 MG/1
25 TABLET ORAL EVERY 8 HOURS PRN
COMMUNITY
End: 2019-06-13 | Stop reason: SDUPTHER

## 2019-06-13 RX ORDER — BLOOD-GLUCOSE METER
1 KIT MISCELLANEOUS DAILY
Qty: 1 KIT | Refills: 0 | Status: SHIPPED | OUTPATIENT
Start: 2019-06-13

## 2019-06-13 RX ORDER — LISINOPRIL 5 MG/1
5 TABLET ORAL DAILY
Qty: 30 TABLET | Refills: 5 | Status: SHIPPED | OUTPATIENT
Start: 2019-06-13 | End: 2019-06-13 | Stop reason: ALTCHOICE

## 2019-06-13 RX ORDER — LANCETS 30 GAUGE
1 EACH MISCELLANEOUS DAILY
Qty: 300 EACH | Refills: 1 | Status: SHIPPED | OUTPATIENT
Start: 2019-06-13

## 2019-06-13 RX ORDER — GLUCOSAMINE HCL/CHONDROITIN SU 500-400 MG
CAPSULE ORAL
Qty: 30 STRIP | Refills: 5 | Status: SHIPPED | OUTPATIENT
Start: 2019-06-13

## 2019-06-13 ASSESSMENT — PATIENT HEALTH QUESTIONNAIRE - PHQ9
1. LITTLE INTEREST OR PLEASURE IN DOING THINGS: 0
SUM OF ALL RESPONSES TO PHQ9 QUESTIONS 1 & 2: 0
SUM OF ALL RESPONSES TO PHQ QUESTIONS 1-9: 0
SUM OF ALL RESPONSES TO PHQ QUESTIONS 1-9: 0
2. FEELING DOWN, DEPRESSED OR HOPELESS: 0

## 2019-06-13 NOTE — PROGRESS NOTES
Patient: Bobby Manzanares    YOB: 1990    Date: 6/13/19    Chief Complaint   Patient presents with   [facility].     Former patient of Dr. Cm Diallo. He is here todat to establish care.  Medication Refill     Pnding       Patient Active Problem List    Diagnosis Date Noted    Ulcerative colitis ([facility] [address]     PDA (patent ductus arteriosus)     Hypertension     Hyperlipidemia     Diabetes mellitus ([facility]     CAD (coronary artery disease)     Aortic stenosis     Vertigo 01/01/2019       Allergies   Allergen Reactions    Latex Anaphylaxis       Vitals:    06/13/19 1045 06/13/19 1055   BP: (!) 146/70 130/80   Site: Right Upper Arm Left Upper Arm   Position: Sitting Sitting   Cuff Size: Large Adult Large Adult   Pulse: 97    Resp: 16    Temp: 98.2 °F (36.8 °C)    TempSrc: Oral    SpO2: 98%    Weight: 205 lb 12.8 oz (93.4 kg)    Height: 5' 6\" (1.676 m)      Body mass index is 33.22 kg/m². HPI This is a new patient to me. I have reviewed all of the past medical history, social history and family history. I have reviewed all of the information on medication, surgeries and previous testing and working diagnoses. I have reviewed the allergies and health maintenance information and correlated it into my decision making for this patient for care, diagnostics, consultations and treatment for today's visit. He has multiple medical issues most of which seem to be congenital.  He sees a [facility] cardiologist for his cardiac care as much of his health problems around that. Also he has diabetes which is not really been monitored very closely. He does not have a glucometer he has not been to diabetic education but he does try to eat very well in terms of no junk food he and his girlfriend took their own food to eat a lot of vegetables he tries to do portion control. He is also been doing several long hikes a week with his girlfriend to get his exercise up.   He denies any physical issues today's review of systems is unremarkable. He has been taking his medication. Spent 30 minutes face-to-face with the patient discussing pertinent clinical issues, assessing pertinent labs and x-rays that have been done concerning these issues and formulating treatment protocols with clinical decision making. Review of Systems    Constitutional: Negative for fatigue, fever and sweats. HEENT: Negative for eye discharge and vision loss. Negative for ear drainage, hearing loss and nasal drainage. Respiratory: Negative for cough, dyspnea and wheezing. Cardiovascular:  Negative for chest pain, claudication and irregular heartbeat/palpitations. Gastrointestinal: Negative for abdominal pain, nausea, vomiting, constipation and diarrhea. Genitourinary: No dysuria or penile discharge. Metabolic/Endocrine: Negative for cold intolerance, heat intolerance, polydipsia and polyphagia. No unintended weight loss or gain. Neuro/Psychiatric: Negative for gait disturbance. Negative for psychiatric symptoms. Dermatologic: Negative for pruritus and rash. Musculoskeletal: Negative for bone/joint symptoms. No numbness or tingling. No weakness. Hematology: Negative for bleeding and easy bruising. Immunology:  Negative for environmental allergies and food allergies. Physical Exam    Patient's medication, allergies, past medical, surgical, social and family histories were reviewed and updated as appropriate. PHYSICAL EXAM   General appearance: Alert oriented pleasant cooperative no acute distress he slightly overweight for his height.   HEENT: Eyes clear nonicteric facial muscles symmetrical.  Hearing and comprehension normal.  Ears TMs intact with some wax but not occlusive canals normal TMs normal, oropharynx clear uvula midline palate normal no lesions  Neck: Soft nontender no adenopathy or masses thyroid not enlarged  Lungs: Clear to auscultation no wheezes rhonchi or rales  Heart: Regular rate and rhythm with a grade 4 holosystolic murmur  Extremities: No rashes or edema globally neurologically intact. Assessment:   Diagnosis Orders   1. Type 2 diabetes mellitus with other circulatory complication, without long-term current use of insulin ([facility])  POCT glycosylated hemoglobin (Hb A1C) this is 9.2 did education with the patient about this and we are going to increase his medication and follow-up with him to see if we can get this under better control he is also going to start checking his own blood sugar several times a week and report back to me    metFORMIN (GLUCOPHAGE) 500 MG tablet    glucose monitoring kit (FREESTYLE) monitoring kit    blood glucose monitor strips    Lancets MISC   2. Hypertension, unspecified type  Comprehensive Metabolic Panel    lisinopril (PRINIVIL;ZESTRIL) 10 MG tablet  Not adequately controlled increase medication to 10 mg a day    DISCONTINUED: lisinopril (PRINIVIL;ZESTRIL) 5 MG tablet   3. Hyperlipidemia, unspecified hyperlipidemia type  atorvastatin (LIPITOR) 20 MG tablet   4. Ulcerative colitis with complication, unspecified location [facility])   reviewed follow-up make sure he has been getting colonoscopies to do cancer surveillance   5. PDA (patent ductus arteriosus)   get [facility] cardiology notes   6. Essential hypertension     7. Mixed hyperlipidemia     8. Coronary artery disease involving native coronary artery of native heart without angina pectoris     9. Aortic valve stenosis, etiology of cardiac valve disease unspecified     10.  Vertigo  meclizine (ANTIVERT) 25 MG tablet               Plan:  Current Outpatient Medications   Medication Sig Dispense Refill    atorvastatin (LIPITOR) 20 MG tablet Take 1 tablet by mouth daily 30 tablet 5    meclizine (ANTIVERT) 25 MG tablet Take 1 tablet by mouth every 8 hours as needed for Dizziness 120 tablet 5    lisinopril (PRINIVIL;ZESTRIL) 10 MG tablet Take 1 tablet by mouth daily 30 tablet 5    metFORMIN (GLUCOPHAGE) 500 MG tablet Take 2 tablets by mouth 2 times daily (with meals) 1220 tablet 5    glucose monitoring kit (FREESTYLE) monitoring kit 1 kit by Does not apply route daily 1 kit 0    blood glucose monitor strips Test once a day & as needed for symptoms of irregular blood glucose. 30 strip 5    Lancets MISC 1 each by Does not apply route daily 300 each 1     No current facility-administered medications for this visit.       Orders Placed This Encounter   Procedures    Comprehensive Metabolic Panel     Standing Status:   Future     Standing Expiration Date:   2020    Lipid Panel     Standing Status:   Future     Standing Expiration Date:   2020     Order Specific Question:   Is Patient Fasting?/# of Hours     Answer:   no    Microalbumin / Creatinine Urine Ratio     Standing Status:   Future     Standing Expiration Date:   2020    POCT glycosylated hemoglobin (Hb A1C)       Orders Placed This Encounter   Medications    DISCONTD: metFORMIN (GLUCOPHAGE) 500 MG tablet     Sig: Take 1 tablet by mouth daily (with breakfast)     Dispense:  30 tablet     Refill:  5    DISCONTD: lisinopril (PRINIVIL;ZESTRIL) 5 MG tablet     Sig: Take 1 tablet by mouth daily     Dispense:  30 tablet     Refill:  5    atorvastatin (LIPITOR) 20 MG tablet     Sig: Take 1 tablet by mouth daily     Dispense:  30 tablet     Refill:  5    meclizine (ANTIVERT) 25 MG tablet     Sig: Take 1 tablet by mouth every 8 hours as needed for Dizziness     Dispense:  120 tablet     Refill:  5    lisinopril (PRINIVIL;ZESTRIL) 10 MG tablet     Sig: Take 1 tablet by mouth daily     Dispense:  30 tablet     Refill:  5    metFORMIN (GLUCOPHAGE) 500 MG tablet     Sig: Take 2 tablets by mouth 2 times daily (with meals)     Dispense:  1220 tablet     Refill:  5    glucose monitoring kit (FREESTYLE) monitoring kit     Si kit by Does not apply route daily     Dispense:  1 kit     Refill:  0    blood glucose monitor strips     Sig: Test once a day & as needed for symptoms

## 2019-11-06 ENCOUNTER — TELEPHONE (OUTPATIENT)
Dept: FAMILY MEDICINE CLINIC | Age: 29
End: 2019-11-06

## 2020-01-01 ENCOUNTER — HOSPITAL ENCOUNTER (EMERGENCY)
Age: 30
Discharge: HOME | End: 2020-01-01

## 2020-01-01 ENCOUNTER — APPOINTMENT (OUTPATIENT)
Dept: CT IMAGING | Age: 30
End: 2020-01-01

## 2020-01-01 VITALS
TEMPERATURE: 98.9 F | BODY MASS INDEX: 33.11 KG/M2 | OXYGEN SATURATION: 98 % | HEIGHT: 66 IN | HEART RATE: 70 BPM | DIASTOLIC BLOOD PRESSURE: 72 MMHG | SYSTOLIC BLOOD PRESSURE: 122 MMHG | RESPIRATION RATE: 16 BRPM | WEIGHT: 206 LBS

## 2020-01-01 LAB
ALBUMIN SERPL-MCNC: 4 G/DL (ref 3.5–4.6)
ALP BLD-CCNC: 58 U/L (ref 35–104)
ALT SERPL-CCNC: 19 U/L (ref 0–41)
AMPHETAMINE SCREEN, URINE: ABNORMAL
ANION GAP SERPL CALCULATED.3IONS-SCNC: 12 MEQ/L (ref 9–15)
AST SERPL-CCNC: 34 U/L (ref 0–40)
BARBITURATE SCREEN URINE: ABNORMAL
BASOPHILS ABSOLUTE: 0.1 K/UL (ref 0–0.2)
BASOPHILS RELATIVE PERCENT: 0.4 %
BENZODIAZEPINE SCREEN, URINE: ABNORMAL
BILIRUB SERPL-MCNC: 0.4 MG/DL (ref 0.2–0.7)
BUN BLDV-MCNC: 9 MG/DL (ref 6–20)
CALCIUM SERPL-MCNC: 9 MG/DL (ref 8.5–9.9)
CANNABINOID SCREEN URINE: POSITIVE
CHLORIDE BLD-SCNC: 100 MEQ/L (ref 95–107)
CO2: 23 MEQ/L (ref 20–31)
COCAINE METABOLITE SCREEN URINE: ABNORMAL
CREAT SERPL-MCNC: 0.76 MG/DL (ref 0.7–1.2)
EKG ATRIAL RATE: 84 BPM
EKG P AXIS: 33 DEGREES
EKG P-R INTERVAL: 144 MS
EKG Q-T INTERVAL: 378 MS
EKG QRS DURATION: 104 MS
EKG QTC CALCULATION (BAZETT): 446 MS
EKG R AXIS: 42 DEGREES
EKG T AXIS: 43 DEGREES
EKG VENTRICULAR RATE: 84 BPM
EOSINOPHILS ABSOLUTE: 0.4 K/UL (ref 0–0.7)
EOSINOPHILS RELATIVE PERCENT: 3 %
GFR AFRICAN AMERICAN: >60
GFR AFRICAN AMERICAN: >60
GFR NON-AFRICAN AMERICAN: >60
GFR NON-AFRICAN AMERICAN: >60
GLOBULIN: 3.6 G/DL (ref 2.3–3.5)
GLUCOSE BLD-MCNC: 95 MG/DL (ref 70–99)
HCT VFR BLD CALC: 41 % (ref 42–52)
HEMOGLOBIN: 13.8 G/DL (ref 14–18)
LYMPHOCYTES ABSOLUTE: 2.9 K/UL (ref 1–4.8)
LYMPHOCYTES RELATIVE PERCENT: 21.3 %
Lab: ABNORMAL
MCH RBC QN AUTO: 27.8 PG (ref 27–31.3)
MCHC RBC AUTO-ENTMCNC: 33.5 % (ref 33–37)
MCV RBC AUTO: 82.7 FL (ref 80–100)
METHADONE SCREEN, URINE: ABNORMAL
MONOCYTES ABSOLUTE: 1.2 K/UL (ref 0.2–0.8)
MONOCYTES RELATIVE PERCENT: 9.1 %
NEUTROPHILS ABSOLUTE: 8.9 K/UL (ref 1.4–6.5)
NEUTROPHILS RELATIVE PERCENT: 66.2 %
OPIATE SCREEN URINE: ABNORMAL
OXYCODONE URINE: ABNORMAL
PDW BLD-RTO: 13.8 % (ref 11.5–14.5)
PERFORMED ON: ABNORMAL
PHENCYCLIDINE SCREEN URINE: ABNORMAL
PLATELET # BLD: 325 K/UL (ref 130–400)
POC CREATININE WHOLE BLOOD: 0.8
POC CREATININE: 0.8 MG/DL (ref 0.9–1.3)
POC SAMPLE TYPE: ABNORMAL
POTASSIUM SERPL-SCNC: 4.5 MEQ/L (ref 3.4–4.9)
PROPOXYPHENE SCREEN: ABNORMAL
RBC # BLD: 4.96 M/UL (ref 4.7–6.1)
REASON FOR REJECTION: NORMAL
REJECTED TEST: NORMAL
SODIUM BLD-SCNC: 135 MEQ/L (ref 135–144)
TOTAL CK: 117 U/L (ref 0–190)
TOTAL PROTEIN: 7.6 G/DL (ref 6.3–8)
TROPONIN: <0.01 NG/ML (ref 0–0.01)
WBC # BLD: 13.5 K/UL (ref 4.8–10.8)

## 2020-01-01 PROCEDURE — 99285 EMERGENCY DEPT VISIT HI MDM: CPT

## 2020-01-01 PROCEDURE — 71275 CT ANGIOGRAPHY CHEST: CPT

## 2020-01-01 PROCEDURE — 84484 ASSAY OF TROPONIN QUANT: CPT

## 2020-01-01 PROCEDURE — 80307 DRUG TEST PRSMV CHEM ANLYZR: CPT

## 2020-01-01 PROCEDURE — 2580000003 HC RX 258: Performed by: PHYSICIAN ASSISTANT

## 2020-01-01 PROCEDURE — 6360000004 HC RX CONTRAST MEDICATION: Performed by: PHYSICIAN ASSISTANT

## 2020-01-01 PROCEDURE — 93005 ELECTROCARDIOGRAM TRACING: CPT

## 2020-01-01 PROCEDURE — 85025 COMPLETE CBC W/AUTO DIFF WBC: CPT

## 2020-01-01 PROCEDURE — 80053 COMPREHEN METABOLIC PANEL: CPT

## 2020-01-01 PROCEDURE — 82550 ASSAY OF CK (CPK): CPT

## 2020-01-01 PROCEDURE — 36415 COLL VENOUS BLD VENIPUNCTURE: CPT

## 2020-01-01 RX ORDER — 0.9 % SODIUM CHLORIDE 0.9 %
1000 INTRAVENOUS SOLUTION INTRAVENOUS ONCE
Status: COMPLETED | OUTPATIENT
Start: 2020-01-01 | End: 2020-01-01

## 2020-01-01 RX ORDER — TRAMADOL HYDROCHLORIDE 50 MG/1
50 TABLET ORAL EVERY 6 HOURS PRN
Qty: 12 TABLET | Refills: 0 | Status: SHIPPED | OUTPATIENT
Start: 2020-01-01 | End: 2020-01-04

## 2020-01-01 RX ADMIN — SODIUM CHLORIDE 1000 ML: 9 INJECTION, SOLUTION INTRAVENOUS at 02:22

## 2020-01-01 RX ADMIN — IOPAMIDOL 100 ML: 755 INJECTION, SOLUTION INTRAVENOUS at 04:09

## 2020-01-01 ASSESSMENT — ENCOUNTER SYMPTOMS
ALLERGIC/IMMUNOLOGIC NEGATIVE: 1
APNEA: 0
TROUBLE SWALLOWING: 0
COLOR CHANGE: 0
SHORTNESS OF BREATH: 1
EYE PAIN: 0
ABDOMINAL PAIN: 0

## 2020-01-01 ASSESSMENT — PAIN SCALES - GENERAL: PAINLEVEL_OUTOF10: 4

## 2020-01-01 ASSESSMENT — PAIN DESCRIPTION - FREQUENCY: FREQUENCY: CONTINUOUS

## 2020-01-01 ASSESSMENT — PAIN DESCRIPTION - PAIN TYPE: TYPE: ACUTE PAIN

## 2020-01-01 ASSESSMENT — PAIN DESCRIPTION - ONSET: ONSET: ON-GOING

## 2020-01-01 ASSESSMENT — PAIN DESCRIPTION - DESCRIPTORS: DESCRIPTORS: THROBBING;SQUEEZING

## 2020-01-01 ASSESSMENT — PAIN DESCRIPTION - LOCATION: LOCATION: CHEST

## 2020-01-01 NOTE — ED NOTES
RN to bedside. This RN attempted venipuncture twice, and the line blew both times. Pt is resting peacefully. Resps are even and unlabored, skin p/w/d, and no acute distress at this time. Will continue to monitor.        Letitia Mcguire, DAMIEN  01/01/20 9692

## 2020-01-01 NOTE — ED TRIAGE NOTES
Patient came to the ED for midsternal chest pain. Patient states the chest pain began in august and is intermittent. Patient describes the pain as throbbing/pulsing. Denies diplopia, blurry vision, SOB, ABD pain, fevers, chills, nausea, vomiting, diarrhea, dysuria. A&Ox4. Respirations even and unlabored.

## 2020-01-01 NOTE — ED NOTES
Phlebotomist Yasmani Desai is at bedside to draw specimen at this time.      Amber Grajeda RN  01/01/20 0087

## 2020-01-01 NOTE — ED NOTES
Patient is alert and oriented, skin is warm and dry, respirations are unlabored, discharged with steady gait to the lobby.       Jessi Wheeler RN  01/01/20 9318

## 2020-01-01 NOTE — ED PROVIDER NOTES
Neurological: Negative for dizziness and numbness. Hematological: Negative. Psychiatric/Behavioral: Negative. All other systems reviewed and are negative. Except as noted above the remainder of the review of systems was reviewed and negative. PAST MEDICAL HISTORY     Past Medical History:   Diagnosis Date    Aortic stenosis     CAD (coronary artery disease)     [facility]     Hyperlipidemia     Hypertension     PDA (patent ductus arteriosus)     [facility]     Vertigo     Vertigo 2019         SURGICALHISTORY       Past Surgical History:   Procedure Laterality Date    AORTA SURGERY      x3    CARDIAC SURGERY      PATENT DUCTUS ARTERIOUS LIGATION           CURRENT MEDICATIONS       Previous Medications    ATORVASTATIN (LIPITOR) 20 MG TABLET    Take 1 tablet by mouth daily    BLOOD GLUCOSE MONITOR STRIPS    Test once a day & as needed for symptoms of irregular blood glucose.     GLUCOSE MONITORING KIT (FREESTYLE) MONITORING KIT    1 kit by Does not apply route daily    LANCETS MISC    1 each by Does not apply route daily    LISINOPRIL (PRINIVIL;ZESTRIL) 10 MG TABLET    Take 1 tablet by mouth daily    MECLIZINE (ANTIVERT) 25 MG TABLET    Take 1 tablet by mouth every 8 hours as needed for Dizziness    METFORMIN (GLUCOPHAGE) 500 MG TABLET    Take 2 tablets by mouth 2 times daily (with meals)       ALLERGIES     Latex    FAMILY HISTORY       Family History   Problem Relation Age of Onset    Heart Attack Mother     Heart Disease Mother     High Blood Pressure Mother     Heart Attack Father     Heart Disease Father           SOCIAL HISTORY       Social History     Socioeconomic History    Marital status: Single     Spouse name: None    Number of children: None    Years of education: None    Highest education level: None   Occupational History    None   Social Needs    Financial resource strain: None    Food insecurity:     Worry: None     Inability: None of motion. Skin:     General: Skin is warm and dry. Findings: No erythema or rash. Neurological:      Mental Status: He is alert and oriented to person, place, and time. Cranial Nerves: No cranial nerve deficit. Motor: No abnormal muscle tone. Psychiatric:         Behavior: Behavior normal.         Thought Content: Thought content normal.         Judgment: Judgment normal.         DIAGNOSTIC RESULTS     EKG: All EKG's are interpreted by the Emergency Department Physician who either signs or Co-signsthis chart in the absence of a cardiologist.      RADIOLOGY:   [facility] such as CT, Ultrasound and MRI are read by the radiologist. [facility] radiographic images are visualized and preliminarily interpreted by the emergency physician with the below findings:    CT of the chest per standard radiology shows no evidence of acute pulmonary embolism. No thoracic aortic aneurysm. Previous sternotomy. Normal heart size. No pericardial effusion. No airspace consolidation pleural effusion or pneumothorax. No acute osseous findings.     Interpretation per the Radiologist below, if available at the time ofthis note:    CTA Chest W WO  (PE study)    (Results Pending)         ED BEDSIDE ULTRASOUND:   Performed by ED Physician - none    LABS:  Labs Reviewed   COMPREHENSIVE METABOLIC PANEL - Abnormal; Notable for the following components:       Result Value    Globulin 3.6 (*)     All other components within normal limits   URINE DRUG SCREEN - Abnormal; Notable for the following components:    Cannabinoid Scrn, Ur POSITIVE (*)     All other components within normal limits   CBC WITH AUTO DIFFERENTIAL - Abnormal; Notable for the following components:    WBC 13.5 (*)     Hemoglobin 13.8 (*)     Hematocrit 41.0 (*)     Neutrophils Absolute 8.9 (*)     Monocytes Absolute 1.2 (*)     All other components within normal limits   POCT CREATININE - URINE - Normal   TROPONIN   CK   SPECIMEN REJECTION       All

## 2020-01-01 NOTE — ED NOTES
RN to bedside. Pt is resting peacefully. Resps are even and unlabored, skin p/w/d, and no acute distress at this time. Will continue to monitor.        Bebo Ivan RN  01/01/20 1423

## 2020-04-28 ENCOUNTER — OFFICE VISIT (OUTPATIENT)
Dept: PRIMARY CARE CLINIC | Age: 30
End: 2020-04-28

## 2020-04-28 VITALS
SYSTOLIC BLOOD PRESSURE: 152 MMHG | WEIGHT: 219 LBS | BODY MASS INDEX: 35.35 KG/M2 | DIASTOLIC BLOOD PRESSURE: 75 MMHG | HEART RATE: 95 BPM | TEMPERATURE: 98.5 F | OXYGEN SATURATION: 100 %

## 2020-04-28 PROCEDURE — 99213 OFFICE O/P EST LOW 20 MIN: CPT | Performed by: NURSE PRACTITIONER

## 2020-04-28 ASSESSMENT — ENCOUNTER SYMPTOMS
SINUS PRESSURE: 0
SORE THROAT: 0
SHORTNESS OF BREATH: 0
COUGH: 1

## 2020-07-08 ENCOUNTER — HOSPITAL ENCOUNTER (EMERGENCY)
Age: 30
Discharge: HOME | End: 2020-07-08
Attending: STUDENT IN AN ORGANIZED HEALTH CARE EDUCATION/TRAINING PROGRAM

## 2020-07-08 ENCOUNTER — APPOINTMENT (OUTPATIENT)
Dept: ULTRASOUND IMAGING | Age: 30
End: 2020-07-08

## 2020-07-08 ENCOUNTER — APPOINTMENT (OUTPATIENT)
Dept: CT IMAGING | Age: 30
End: 2020-07-08

## 2020-07-08 VITALS
BODY MASS INDEX: 33.32 KG/M2 | HEIGHT: 65 IN | SYSTOLIC BLOOD PRESSURE: 107 MMHG | HEART RATE: 84 BPM | TEMPERATURE: 96.4 F | OXYGEN SATURATION: 100 % | DIASTOLIC BLOOD PRESSURE: 71 MMHG | RESPIRATION RATE: 16 BRPM | WEIGHT: 200 LBS

## 2020-07-08 LAB
ALBUMIN SERPL-MCNC: 4.2 G/DL (ref 3.5–4.6)
ALP BLD-CCNC: 60 U/L (ref 35–104)
ALT SERPL-CCNC: 7 U/L (ref 0–41)
ANION GAP SERPL CALCULATED.3IONS-SCNC: 9 MEQ/L (ref 9–15)
ANISOCYTOSIS: ABNORMAL
APTT: 28.2 SEC (ref 24.4–36.8)
AST SERPL-CCNC: 12 U/L (ref 0–40)
BASOPHILS ABSOLUTE: 0.1 K/UL (ref 0–0.2)
BASOPHILS RELATIVE PERCENT: 0.6 %
BILIRUB SERPL-MCNC: <0.2 MG/DL (ref 0.2–0.7)
BUN BLDV-MCNC: 10 MG/DL (ref 6–20)
CALCIUM SERPL-MCNC: 8.8 MG/DL (ref 8.5–9.9)
CHLORIDE BLD-SCNC: 107 MEQ/L (ref 95–107)
CO2: 23 MEQ/L (ref 20–31)
CREAT SERPL-MCNC: 0.86 MG/DL (ref 0.7–1.2)
EOSINOPHILS ABSOLUTE: 0.5 K/UL (ref 0–0.7)
EOSINOPHILS RELATIVE PERCENT: 5.2 %
GFR AFRICAN AMERICAN: >60
GFR NON-AFRICAN AMERICAN: >60
GLOBULIN: 3.3 G/DL (ref 2.3–3.5)
GLUCOSE BLD-MCNC: 100 MG/DL (ref 70–99)
HCT VFR BLD CALC: 26.3 % (ref 42–52)
HEMOGLOBIN: 7.7 G/DL (ref 14–18)
HYPOCHROMIA: ABNORMAL
INR BLD: 1.1
LYMPHOCYTES ABSOLUTE: 2.3 K/UL (ref 1–4.8)
LYMPHOCYTES RELATIVE PERCENT: 23 %
MCH RBC QN AUTO: 18.1 PG (ref 27–31.3)
MCHC RBC AUTO-ENTMCNC: 29.5 % (ref 33–37)
MCV RBC AUTO: 61.3 FL (ref 80–100)
MICROCYTES: ABNORMAL
MONOCYTES ABSOLUTE: 1.1 K/UL (ref 0.2–0.8)
MONOCYTES RELATIVE PERCENT: 11.3 %
NEUTROPHILS ABSOLUTE: 5.9 K/UL (ref 1.4–6.5)
NEUTROPHILS RELATIVE PERCENT: 59.9 %
OVALOCYTES: ABNORMAL
PDW BLD-RTO: 17.7 % (ref 11.5–14.5)
PLATELET # BLD: 488 K/UL (ref 130–400)
POC CREATININE WHOLE BLOOD: 0.9
POIKILOCYTES: ABNORMAL
POTASSIUM SERPL-SCNC: 4.1 MEQ/L (ref 3.4–4.9)
PROTHROMBIN TIME: 13.9 SEC (ref 12.3–14.9)
RBC # BLD: 4.29 M/UL (ref 4.7–6.1)
SLIDE REVIEW: ABNORMAL
SODIUM BLD-SCNC: 139 MEQ/L (ref 135–144)
TOTAL CK: 108 U/L (ref 0–190)
TOTAL PROTEIN: 7.5 G/DL (ref 6.3–8)
WBC # BLD: 9.9 K/UL (ref 4.8–10.8)

## 2020-07-08 PROCEDURE — 80053 COMPREHEN METABOLIC PANEL: CPT

## 2020-07-08 PROCEDURE — 2580000003 HC RX 258: Performed by: STUDENT IN AN ORGANIZED HEALTH CARE EDUCATION/TRAINING PROGRAM

## 2020-07-08 PROCEDURE — 6370000000 HC RX 637 (ALT 250 FOR IP): Performed by: STUDENT IN AN ORGANIZED HEALTH CARE EDUCATION/TRAINING PROGRAM

## 2020-07-08 PROCEDURE — 6360000002 HC RX W HCPCS: Performed by: STUDENT IN AN ORGANIZED HEALTH CARE EDUCATION/TRAINING PROGRAM

## 2020-07-08 PROCEDURE — 85025 COMPLETE CBC W/AUTO DIFF WBC: CPT

## 2020-07-08 PROCEDURE — 74177 CT ABD & PELVIS W/CONTRAST: CPT

## 2020-07-08 PROCEDURE — 6360000004 HC RX CONTRAST MEDICATION: Performed by: STUDENT IN AN ORGANIZED HEALTH CARE EDUCATION/TRAINING PROGRAM

## 2020-07-08 PROCEDURE — 36415 COLL VENOUS BLD VENIPUNCTURE: CPT

## 2020-07-08 PROCEDURE — 99283 EMERGENCY DEPT VISIT LOW MDM: CPT

## 2020-07-08 PROCEDURE — 85610 PROTHROMBIN TIME: CPT

## 2020-07-08 PROCEDURE — 76870 US EXAM SCROTUM: CPT

## 2020-07-08 PROCEDURE — 85730 THROMBOPLASTIN TIME PARTIAL: CPT

## 2020-07-08 PROCEDURE — 96372 THER/PROPH/DIAG INJ SC/IM: CPT

## 2020-07-08 PROCEDURE — 82550 ASSAY OF CK (CPK): CPT

## 2020-07-08 PROCEDURE — 96374 THER/PROPH/DIAG INJ IV PUSH: CPT

## 2020-07-08 RX ORDER — SODIUM CHLORIDE 0.9 % (FLUSH) 0.9 %
10 SYRINGE (ML) INJECTION ONCE
Status: COMPLETED | OUTPATIENT
Start: 2020-07-08 | End: 2020-07-08

## 2020-07-08 RX ORDER — CEFTRIAXONE 1 G/1
1 INJECTION, POWDER, FOR SOLUTION INTRAMUSCULAR; INTRAVENOUS ONCE
Status: COMPLETED | OUTPATIENT
Start: 2020-07-08 | End: 2020-07-08

## 2020-07-08 RX ORDER — DICYCLOMINE HYDROCHLORIDE 10 MG/1
10 CAPSULE ORAL EVERY 6 HOURS PRN
Qty: 12 CAPSULE | Refills: 0 | Status: ON HOLD | OUTPATIENT
Start: 2020-07-08 | End: 2020-10-01

## 2020-07-08 RX ORDER — DOXYCYCLINE HYCLATE 100 MG/1
100 CAPSULE ORAL 2 TIMES DAILY
Qty: 20 CAPSULE | Refills: 0 | Status: SHIPPED | OUTPATIENT
Start: 2020-07-08 | End: 2020-07-18

## 2020-07-08 RX ORDER — DOXYCYCLINE HYCLATE 100 MG/1
100 CAPSULE ORAL ONCE
Status: COMPLETED | OUTPATIENT
Start: 2020-07-08 | End: 2020-07-08

## 2020-07-08 RX ORDER — 0.9 % SODIUM CHLORIDE 0.9 %
1000 INTRAVENOUS SOLUTION INTRAVENOUS ONCE
Status: COMPLETED | OUTPATIENT
Start: 2020-07-08 | End: 2020-07-08

## 2020-07-08 RX ORDER — KETOROLAC TROMETHAMINE 30 MG/ML
30 INJECTION, SOLUTION INTRAMUSCULAR; INTRAVENOUS ONCE
Status: COMPLETED | OUTPATIENT
Start: 2020-07-08 | End: 2020-07-08

## 2020-07-08 RX ORDER — FERROUS SULFATE 325(65) MG
325 TABLET ORAL 2 TIMES DAILY
Qty: 60 TABLET | Refills: 0 | Status: SHIPPED | OUTPATIENT
Start: 2020-07-08

## 2020-07-08 RX ORDER — MELOXICAM 15 MG/1
15 TABLET ORAL DAILY
Qty: 7 TABLET | Refills: 0 | Status: ON HOLD | OUTPATIENT
Start: 2020-07-08 | End: 2020-10-01

## 2020-07-08 RX ADMIN — KETOROLAC TROMETHAMINE 30 MG: 30 INJECTION, SOLUTION INTRAMUSCULAR at 15:58

## 2020-07-08 RX ADMIN — Medication 10 ML: at 17:51

## 2020-07-08 RX ADMIN — SODIUM CHLORIDE 1000 ML: 9 INJECTION, SOLUTION INTRAVENOUS at 15:58

## 2020-07-08 RX ADMIN — DOXYCYCLINE HYCLATE 100 MG: 100 CAPSULE ORAL at 17:47

## 2020-07-08 RX ADMIN — IOPAMIDOL 100 ML: 612 INJECTION, SOLUTION INTRAVENOUS at 16:46

## 2020-07-08 RX ADMIN — CEFTRIAXONE SODIUM 1 G: 1 INJECTION, POWDER, FOR SOLUTION INTRAMUSCULAR; INTRAVENOUS at 17:47

## 2020-07-08 ASSESSMENT — ENCOUNTER SYMPTOMS
TROUBLE SWALLOWING: 0
VOMITING: 0
SINUS PRESSURE: 0
COUGH: 0
SHORTNESS OF BREATH: 0
NAUSEA: 1
BACK PAIN: 0
CHEST TIGHTNESS: 0
DIARRHEA: 0
ABDOMINAL PAIN: 1

## 2020-07-08 ASSESSMENT — PAIN SCALES - WONG BAKER: WONGBAKER_NUMERICALRESPONSE: 2

## 2020-07-08 ASSESSMENT — PAIN DESCRIPTION - FREQUENCY
FREQUENCY: CONTINUOUS
FREQUENCY: CONTINUOUS

## 2020-07-08 ASSESSMENT — PAIN DESCRIPTION - PAIN TYPE
TYPE: ACUTE PAIN
TYPE: ACUTE PAIN

## 2020-07-08 ASSESSMENT — PAIN DESCRIPTION - PROGRESSION
CLINICAL_PROGRESSION: NOT CHANGED
CLINICAL_PROGRESSION: GRADUALLY WORSENING

## 2020-07-08 ASSESSMENT — PAIN DESCRIPTION - DESCRIPTORS: DESCRIPTORS: CONSTANT

## 2020-07-08 ASSESSMENT — PAIN DESCRIPTION - ORIENTATION: ORIENTATION: MID

## 2020-07-08 ASSESSMENT — PAIN SCALES - GENERAL
PAINLEVEL_OUTOF10: 7
PAINLEVEL_OUTOF10: 7

## 2020-07-08 ASSESSMENT — PAIN DESCRIPTION - ONSET: ONSET: ON-GOING

## 2020-07-08 ASSESSMENT — PAIN DESCRIPTION - LOCATION: LOCATION: GROIN

## 2020-07-08 ASSESSMENT — PAIN - FUNCTIONAL ASSESSMENT: PAIN_FUNCTIONAL_ASSESSMENT: PREVENTS OR INTERFERES SOME ACTIVE ACTIVITIES AND ADLS

## 2020-07-08 NOTE — ED PROVIDER NOTES
 Marital status: Single     Spouse name: Not on file    Number of children: Not on file    Years of education: Not on file    Highest education level: Not on file   Occupational History    Not on file   Social Needs    Financial resource strain: Not on file    Food insecurity     Worry: Not on file     Inability: Not on file    Transportation needs     Medical: Not on file     Non-medical: Not on file   Tobacco Use    Smoking status: Never Smoker    Smokeless tobacco: Never Used   Substance and Sexual Activity    Alcohol use: No    Drug use: No    Sexual activity: Yes     Partners: Female   Lifestyle    Physical activity     Days per week: Not on file     Minutes per session: Not on file    Stress: Not on file   Relationships    Social connections     Talks on phone: Not on file     Gets together: Not on file     Attends Alevism service: Not on file     Active member of club or organization: Not on file     Attends meetings of clubs or organizations: Not on file     Relationship status: Not on file    Intimate partner violence     Fear of current or ex partner: Not on file     Emotionally abused: Not on file     Physically abused: Not on file     Forced sexual activity: Not on file   Other Topics Concern    Not on file   Social History Narrative    Not on file       SCREENINGS      @[facility]([id number])@      PHYSICAL EXAM    (up to 7 for level 4, 8 or more for level 5)     ED Triage Vitals [07/08/20 1521]   BP Temp Temp Source Pulse Resp SpO2 Height Weight   116/74 96.4 °F (35.8 °C) Temporal 88 16 99 % 5' 5\" (1.651 m) 200 lb (90.7 kg)       Physical Exam  Vitals signs and nursing note reviewed. Constitutional:       General: He is awake. He is not in acute distress. Appearance: Normal appearance. He is well-developed. He is obese. He is not ill-appearing, toxic-appearing or diaphoretic. Comments: No photophobia. No phonophobia. HENT:      Head: Normocephalic and atraumatic.  No Cifuentes's sign. Right Ear: External ear normal.      Left Ear: External ear normal.      Nose: Nose normal. No congestion or rhinorrhea. Mouth/Throat:      Mouth: Mucous membranes are moist.      Pharynx: Oropharynx is clear. No oropharyngeal exudate or posterior oropharyngeal erythema. Eyes:      General: No scleral icterus. Right eye: No foreign body or discharge. Left eye: No discharge. Extraocular Movements: Extraocular movements intact. Conjunctiva/sclera: Conjunctivae normal.      Left eye: No exudate. Pupils: Pupils are equal, round, and reactive to light. Neck:      Musculoskeletal: Normal range of motion and neck supple. No neck rigidity. Vascular: No JVD. Trachea: No tracheal deviation. Comments: No meningismus. Cardiovascular:      Rate and Rhythm: Normal rate and regular rhythm. Pulses: Normal pulses. Heart sounds: Normal heart sounds. Heart sounds not distant. No murmur. No friction rub. No gallop. Pulmonary:      Effort: Pulmonary effort is normal. No respiratory distress. Breath sounds: Normal breath sounds. No stridor. No wheezing, rhonchi or rales. Chest:      Chest wall: No tenderness. Abdominal:      General: Abdomen is flat. Bowel sounds are normal. There is no distension. Palpations: Abdomen is soft. There is no shifting dullness, fluid wave, hepatomegaly, splenomegaly, mass or pulsatile mass. Tenderness: There is abdominal tenderness in the right lower quadrant. There is no right CVA tenderness, left CVA tenderness, guarding or rebound. Negative signs include Rovsing's sign, psoas sign and obturator sign. Hernia: A hernia is present. Hernia is present in the right inguinal area. There is no hernia in the left inguinal area. Comments: No flank ecchymosis. No midline pulsatile mass. Negative [place] sign. No caput medusae. Genitourinary:     Penis: Normal and circumcised.        Scrotum/Testes: PATHOLOGY IN THE ABDOMEN OR PELVIS. SMALL BILATERAL INGUINAL FAT HERNIAS. US SCROTUM AND TESTICLES   Final Result   Tiny right hydrocele with mild right epididymitis. No evidence of torsion. ED BEDSIDE ULTRASOUND:   Performed by ED Physician - none    LABS:  Labs Reviewed   CBC WITH AUTO DIFFERENTIAL - Abnormal; Notable for the following components:       Result Value    RBC 4.29 (*)     Hemoglobin 7.7 (*)     Hematocrit 26.3 (*)     MCV 61.3 (*)     MCH 18.1 (*)     MCHC 29.5 (*)     RDW 17.7 (*)     Platelets 613 (*)     All other components within normal limits   COMPREHENSIVE METABOLIC PANEL - Abnormal; Notable for the following components:    Glucose 100 (*)     All other components within normal limits   POCT CREATININE - URINE - Normal   PROTIME-INR   APTT   CK   URINE RT REFLEX TO CULTURE       All other labs were within normal range or not returned as of this dictation. EMERGENCY DEPARTMENT COURSE and DIFFERENTIAL DIAGNOSIS/MDM:   Vitals:    Vitals:    07/08/20 1521 07/08/20 1716   BP: 116/74 107/71   Pulse: 88 84   Resp: 16 16   Temp: 96.4 °F (35.8 °C)    TempSrc: Temporal    SpO2: 99% 100%   Weight: 200 lb (90.7 kg)    Height: 5' 5\" (1.651 m)            MDM  Patient's CAT scan shows bilateral inguinal hernias. There is no strangulation. Patient's ultrasound shows epididymitis. Clinical pharmacy consult with [facility] recommends 1 g of IM Rocephin followed by doxycycline 100 mg twice daily x10 days. Patient was given IV Toradol which significantly improved his pain. Patient has microcytic anemia which is likely due to anemia of chronic disease, from ulcerative colitis. Patient be started on iron supplementation. Patient is to follow-up with [facility] and dentistry clinic. The findings were discussed with the patient and his wife.   They verbalized understanding the care that was discussed with them and they have no further questions. CONSULTS:  IP CONSULT TO PHARMACY    PROCEDURES:  Unless otherwise noted below, none     Procedures    FINAL IMPRESSION      1. Epididymitis, right    2. Bilateral inguinal hernia without obstruction or gangrene, recurrence not specified    3. Microcytic anemia    4. History of ulcerative colitis          DISPOSITION/PLAN   DISPOSITION Discharge - Pending Orders Complete 07/08/2020 05:22:05 PM      PATIENT REFERRED TO:  [facility]  [address]  Schedule an appointment as soon as possible for a visit in 1 day      Suzan Browne MD  [facility] [phone] [facility]  [phone]    Call in 3 days  To arrange outpatient follow-up for bilateral inguinal hernias.       DISCHARGE MEDICATIONS:  New Prescriptions    DICYCLOMINE (BENTYL) 10 MG CAPSULE    Take 1 capsule by mouth every 6 hours as needed (cramps)    DOXYCYCLINE HYCLATE (VIBRAMYCIN) 100 MG CAPSULE    Take 1 capsule by mouth 2 times daily for 10 days    FERROUS SULFATE (IRON 325) 325 (65 FE) MG TABLET    Take 1 tablet by mouth 2 times daily    MELOXICAM (MOBIC) 15 MG TABLET    Take 1 tablet by mouth daily for 7 days          (Please note that portions of this note were completed with a voice recognition program.  Efforts were made to edit the dictations but occasionally words are mis-transcribed.)    Zacarias Segura DO (electronically signed)  Attending Emergency Physician          Zacarias Segura DO  07/08/20 8268

## 2020-07-09 LAB
GFR AFRICAN AMERICAN: >60
GFR NON-AFRICAN AMERICAN: >60
PERFORMED ON: NORMAL
POC CREATININE: 0.9 MG/DL (ref 0.9–1.3)
POC SAMPLE TYPE: NORMAL

## 2020-07-15 NOTE — CARE COORDINATION
I received a call from Louie Martinez who is stating that the patient never got his scripts filled after they were given to him 7/8/20 stating that he could not afford them. I spoke with Dr Diaz Padilla director of ER and received verbal permission to send meds via [facility] program to be filled. Ms Erich Elmore states she will contact the patient and inform him to come to the ER  and have them contact me.

## 2020-07-30 ENCOUNTER — HOSPITAL ENCOUNTER (EMERGENCY)
Age: 30
Discharge: HOME | End: 2020-07-30

## 2020-07-30 VITALS
BODY MASS INDEX: 32.62 KG/M2 | RESPIRATION RATE: 18 BRPM | WEIGHT: 203 LBS | DIASTOLIC BLOOD PRESSURE: 64 MMHG | HEIGHT: 66 IN | OXYGEN SATURATION: 98 % | SYSTOLIC BLOOD PRESSURE: 121 MMHG | HEART RATE: 75 BPM | TEMPERATURE: 98.7 F

## 2020-07-30 PROCEDURE — 99282 EMERGENCY DEPT VISIT SF MDM: CPT

## 2020-07-30 RX ORDER — PERMETHRIN 50 MG/G
CREAM TOPICAL
Qty: 1 TUBE | Refills: 0 | Status: ON HOLD | OUTPATIENT
Start: 2020-07-30 | End: 2020-10-01

## 2020-07-30 ASSESSMENT — ENCOUNTER SYMPTOMS
RHINORRHEA: 0
CONSTIPATION: 0
ABDOMINAL PAIN: 0
EYE DISCHARGE: 0
TROUBLE SWALLOWING: 0
COLOR CHANGE: 0
BLOOD IN STOOL: 0
NAUSEA: 0
COUGH: 0
EYE PAIN: 0
BACK PAIN: 0
WHEEZING: 0
SORE THROAT: 0
SHORTNESS OF BREATH: 0
VOMITING: 0
EYE REDNESS: 0
DIARRHEA: 0

## 2020-07-30 NOTE — ED PROVIDER NOTES
[address] [facility] ED  EMERGENCY DEPARTMENT ENCOUNTER      Pt Name: Chiquis Goodwin  MRN: [id number]  [place] 1990  Date of evaluation: 7/30/2020  Provider: Collene Peabody, APRN - CNP    CHIEF COMPLAINT       Chief Complaint   Patient presents with    Rash     onset monday, causing sleeplessness         HISTORY OF PRESENT ILLNESS   (Location/Symptom, Timing/Onset,Context/Setting, Quality, Duration, Modifying Factors, Severity)  Note limiting factors. Anika Petersen II is a 34 y.o. male who presents to the emergency department with a chart reviewed past medical history of ulcerative colitis, hypertension, hyperlipidemia, coronary artery disease and aortic stenosis for a chief complaint of diffuse rash. Patient reported that he noted the rash on Monday. He states that it first appeared in between his fingers, and then it started refusing to the rest of his body. Reports itching. He denies any fevers or chills. Denies alleviating modifying factors. Nursing Notes were reviewed. REVIEW OF SYSTEMS    (2-9 systems for level 4, 10 or more for level 5)     Review of Systems   Constitutional: Negative for activity change, appetite change, fatigue and fever. HENT: Negative for congestion, ear pain, rhinorrhea, sore throat and trouble swallowing. Eyes: Negative for pain, discharge and redness. Respiratory: Negative for cough, shortness of breath and wheezing. Cardiovascular: Negative for chest pain and palpitations. Gastrointestinal: Negative for abdominal pain, blood in stool, constipation, diarrhea, nausea and vomiting. Endocrine: Negative for polydipsia and polyuria. Genitourinary: Negative for decreased urine volume, dysuria, flank pain and hematuria. Musculoskeletal: Negative for arthralgias, back pain and myalgias. Skin: Positive for rash. Negative for color change and wound. Neurological: Negative for dizziness, syncope, weakness, light-headedness and headaches. Psychiatric/Behavioral: Negative for behavioral problems. All other systems reviewed and are negative. Except as noted above the remainder of the review of systems was reviewed and negative.        PAST MEDICAL HISTORY     Past Medical History:   Diagnosis Date    Aortic stenosis     CAD (coronary artery disease)     Diabetes mellitus (HCC)     Hyperlipidemia     Hypertension     PDA (patent ductus arteriosus)     Ulcerative colitis ([facility] [address]     Vertigo     Vertigo 2019     Past Surgical History:   Procedure Laterality Date    AORTA SURGERY      x3    CARDIAC SURGERY      PATENT DUCTUS ARTERIOUS LIGATION       Social History     Socioeconomic History    Marital status: Single     Spouse name: Not on file    Number of children: Not on file    Years of education: Not on file    Highest education level: Not on file   Occupational History    Not on file   Social Needs    Financial resource strain: Not on file    Food insecurity     Worry: Not on file     Inability: Not on file    Transportation needs     Medical: Not on file     Non-medical: Not on file   Tobacco Use    Smoking status: Never Smoker    Smokeless tobacco: Never Used   Substance and Sexual Activity    Alcohol use: No    Drug use: No    Sexual activity: Yes     Partners: Female   Lifestyle    Physical activity     Days per week: Not on file     Minutes per session: Not on file    Stress: Not on file   Relationships    Social connections     Talks on phone: Not on file     Gets together: Not on file     Attends Orthodoxy service: Not on file     Active member of club or organization: Not on file     Attends meetings of clubs or organizations: Not on file     Relationship status: Not on file    Intimate partner violence     Fear of current or ex partner: Not on file     Emotionally abused: Not on file     Physically abused: Not on file     Forced sexual activity: Not on file   Other Topics Concern    Not on file   Social findings:        Interpretation per the Radiologist below, if available at the time ofthis note:    No orders to display         ED BEDSIDE ULTRASOUND:   Performed by ED Physician - none    LABS:  Labs Reviewed - No data to display    All other labs were within normal range or not returned as of this dictation. EMERGENCY DEPARTMENT COURSE and DIFFERENTIAL DIAGNOSIS/MDM:   Vitals:    Vitals:    07/30/20 1202   BP: 121/64   Pulse: 75   Resp: 18   Temp: 98.7 °F (37.1 °C)   TempSrc: Oral   SpO2: 98%   Weight: 203 lb (92.1 kg)   Height: 5' 6\" (1.676 m)            MDM   Patient is a 22-year-old male presenting to the ER with a chief complaint of diffuse rash. Patient is hemodynamically stable desk appearing. Patient's diffuse rash is scattered and does appear to be consistent with scabies. He is given a prescription for permethrin and instructed on contagious protocol. He is instructed to come back to the emergency department if he worsens and instructed to follow-up with his primary care doctor. Discharged in stable condition with stable vital signs. CRITICAL CARE TIME       CONSULTS:  None    PROCEDURES:  Unless otherwise noted below, none     Procedures    FINAL IMPRESSION      1.  Scabies          DISPOSITION/PLAN   DISPOSITION Decision To Discharge 07/30/2020 12:24:16 PM      PATIENT REFERRED TO:  Maximo Beltrán MD  [address] [place] [address]  [phone]    Schedule an appointment as soon as possible for a visit in 1 day        DISCHARGE MEDICATIONS:  New Prescriptions    PERMETHRIN (ELIMITE) 5 % CREAM    Apply topically as directed          (Please notethat portions of this note were completed with a voice recognition program.  Efforts were made to edit the dictations but occasionally words are mis-transcribed.)    NICOLE Paulino CNP (electronically signed)  Attending Emergency Physician          NICOLE Watson CNP  07/30/20 5645

## 2020-09-16 ENCOUNTER — HOSPITAL ENCOUNTER (EMERGENCY)
Age: 30
Discharge: HOME | End: 2020-09-16
Attending: EMERGENCY MEDICINE

## 2020-09-16 ENCOUNTER — APPOINTMENT (OUTPATIENT)
Dept: GENERAL RADIOLOGY | Age: 30
End: 2020-09-16

## 2020-09-16 VITALS
RESPIRATION RATE: 16 BRPM | WEIGHT: 200 LBS | TEMPERATURE: 98.4 F | OXYGEN SATURATION: 100 % | DIASTOLIC BLOOD PRESSURE: 57 MMHG | HEART RATE: 81 BPM | BODY MASS INDEX: 32.14 KG/M2 | HEIGHT: 66 IN | SYSTOLIC BLOOD PRESSURE: 117 MMHG

## 2020-09-16 PROCEDURE — 6360000002 HC RX W HCPCS: Performed by: EMERGENCY MEDICINE

## 2020-09-16 PROCEDURE — 96372 THER/PROPH/DIAG INJ SC/IM: CPT

## 2020-09-16 PROCEDURE — 6370000000 HC RX 637 (ALT 250 FOR IP): Performed by: EMERGENCY MEDICINE

## 2020-09-16 PROCEDURE — 99283 EMERGENCY DEPT VISIT LOW MDM: CPT

## 2020-09-16 PROCEDURE — 73502 X-RAY EXAM HIP UNI 2-3 VIEWS: CPT

## 2020-09-16 RX ORDER — OXYCODONE HYDROCHLORIDE AND ACETAMINOPHEN 5; 325 MG/1; MG/1
1 TABLET ORAL ONCE
Status: COMPLETED | OUTPATIENT
Start: 2020-09-16 | End: 2020-09-16

## 2020-09-16 RX ORDER — KETOROLAC TROMETHAMINE 30 MG/ML
30 INJECTION, SOLUTION INTRAMUSCULAR; INTRAVENOUS ONCE
Status: COMPLETED | OUTPATIENT
Start: 2020-09-16 | End: 2020-09-16

## 2020-09-16 RX ORDER — OXYCODONE HYDROCHLORIDE AND ACETAMINOPHEN 5; 325 MG/1; MG/1
1 TABLET ORAL EVERY 6 HOURS PRN
Qty: 12 TABLET | Refills: 0 | Status: SHIPPED | OUTPATIENT
Start: 2020-09-16 | End: 2020-09-19

## 2020-09-16 RX ADMIN — OXYCODONE HYDROCHLORIDE AND ACETAMINOPHEN 1 TABLET: 5; 325 TABLET ORAL at 07:33

## 2020-09-16 RX ADMIN — KETOROLAC TROMETHAMINE 30 MG: 30 INJECTION, SOLUTION INTRAMUSCULAR at 07:32

## 2020-09-16 ASSESSMENT — PAIN SCALES - GENERAL
PAINLEVEL_OUTOF10: 7
PAINLEVEL_OUTOF10: 7

## 2020-09-16 ASSESSMENT — ENCOUNTER SYMPTOMS
NAUSEA: 0
SHORTNESS OF BREATH: 0
DIARRHEA: 0
SORE THROAT: 0
BACK PAIN: 0
ABDOMINAL PAIN: 0
COUGH: 0
VOMITING: 0

## 2020-09-16 ASSESSMENT — PAIN DESCRIPTION - ORIENTATION: ORIENTATION: RIGHT

## 2020-09-16 ASSESSMENT — PAIN DESCRIPTION - DESCRIPTORS: DESCRIPTORS: SHARP

## 2020-09-16 ASSESSMENT — PAIN DESCRIPTION - PAIN TYPE: TYPE: ACUTE PAIN

## 2020-09-16 ASSESSMENT — PAIN DESCRIPTION - LOCATION: LOCATION: HIP

## 2020-09-16 NOTE — ED PROVIDER NOTES
hours as needed (cramps)    FERROUS SULFATE (IRON 325) 325 (65 FE) MG TABLET    Take 1 tablet by mouth 2 times daily    GLUCOSE MONITORING KIT (FREESTYLE) MONITORING KIT    1 kit by Does not apply route daily    LANCETS MISC    1 each by Does not apply route daily    LISINOPRIL (PRINIVIL;ZESTRIL) 10 MG TABLET    Take 1 tablet by mouth daily    MECLIZINE (ANTIVERT) 25 MG TABLET    Take 1 tablet by mouth every 8 hours as needed for Dizziness    MELOXICAM (MOBIC) 15 MG TABLET    Take 1 tablet by mouth daily for 7 days    METFORMIN (GLUCOPHAGE) 500 MG TABLET    Take 2 tablets by mouth 2 times daily (with meals)    PERMETHRIN (ELIMITE) 5 % CREAM    Apply topically as directed       ALLERGIES     Latex    FAMILY HISTORY       Family History   Problem Relation Age of Onset    Heart Attack Mother     Heart Disease Mother     High Blood Pressure Mother     Heart Attack Father     Heart Disease Father           SOCIAL HISTORY       Social History     Socioeconomic History    Marital status: Single     Spouse name: None    Number of children: None    Years of education: None    Highest education level: None   Occupational History    None   Social Needs    Financial resource strain: None    Food insecurity     Worry: None     Inability: None    Transportation needs     Medical: None     Non-medical: None   Tobacco Use    Smoking status: Never Smoker    Smokeless tobacco: Never Used   Substance and Sexual Activity    Alcohol use: No    Drug use: No    Sexual activity: Yes     Partners: Female   Lifestyle    Physical activity     Days per week: None     Minutes per session: None    Stress: None   Relationships    Social connections     Talks on phone: None     Gets together: None     Attends Evangelical service: None     Active member of club or organization: None     Attends meetings of clubs or organizations: None     Relationship status: None    Intimate partner violence     Fear of current or ex partner: None     Emotionally abused: None     Physically abused: None     Forced sexual activity: None   Other Topics Concern    None   Social History Narrative    None         PHYSICAL EXAM       ED Triage Vitals   BP Temp Temp Source Pulse Resp SpO2 Height Weight   09/16/20 0715 09/16/20 0712 09/16/20 0712 09/16/20 0712 09/16/20 0712 09/16/20 0712 09/16/20 0712 09/16/20 0712   (!) 117/57 98.4 °F (36.9 °C) Oral 81 16 100 % 5' 6\" (1.676 m) 200 lb (90.7 kg)       Physical Exam  Vitals signs and nursing note reviewed. Constitutional:       Appearance: He is well-developed. HENT:      Head: Normocephalic. Right Ear: External ear normal.      Left Ear: External ear normal.   Eyes:      Conjunctiva/sclera: Conjunctivae normal.      Pupils: Pupils are equal, round, and reactive to light. Neck:      Musculoskeletal: Normal range of motion and neck supple. Cardiovascular:      Rate and Rhythm: Normal rate and regular rhythm. Heart sounds: Normal heart sounds. Pulmonary:      Effort: Pulmonary effort is normal.      Breath sounds: Normal breath sounds. Abdominal:      General: Bowel sounds are normal. There is no distension. Palpations: Abdomen is soft. Tenderness: There is no abdominal tenderness. Musculoskeletal: Normal range of motion. Comments: +Tenderness to palpation in R hip. Full ROM of R hip. RLE neurovascularly intact. Skin:     General: Skin is warm and dry. Neurological:      Mental Status: He is alert and oriented to person, place, and time. Psychiatric:         Mood and Affect: Mood normal.           MDM  28 yo male presents to the ED with R hip pain. Pt is afebrile, hemodynamically stable. Pt given PO percocet, IM toradol with moderate relief. XR of hip negative. Unsure of etiology of hip pain. Pt given prescription for percocet and ortho referral.  Pt will f/u with Ortho for further evaluation. Pt understands plan. FINAL IMPRESSION      1.  Right hip pain          DISPOSITION/PLAN   DISPOSITION Decision To Discharge 09/16/2020 08:10:49 AM        DISCHARGE MEDICATIONS:  [unfilled]         Analia Shannon MD(electronically signed)  Attending Emergency Physician            Analia Shannon MD  09/16/20 0970

## 2020-09-25 ENCOUNTER — HOSPITAL ENCOUNTER (EMERGENCY)
Age: 30
Discharge: HOME | End: 2020-09-25

## 2020-09-25 ENCOUNTER — APPOINTMENT (OUTPATIENT)
Dept: CT IMAGING | Age: 30
End: 2020-09-25

## 2020-09-25 VITALS
WEIGHT: 203 LBS | SYSTOLIC BLOOD PRESSURE: 134 MMHG | HEART RATE: 76 BPM | HEIGHT: 66 IN | OXYGEN SATURATION: 100 % | TEMPERATURE: 98.3 F | BODY MASS INDEX: 32.62 KG/M2 | DIASTOLIC BLOOD PRESSURE: 73 MMHG | RESPIRATION RATE: 19 BRPM

## 2020-09-25 PROCEDURE — 99283 EMERGENCY DEPT VISIT LOW MDM: CPT

## 2020-09-25 PROCEDURE — 73700 CT LOWER EXTREMITY W/O DYE: CPT

## 2020-09-25 PROCEDURE — 96372 THER/PROPH/DIAG INJ SC/IM: CPT

## 2020-09-25 PROCEDURE — 6360000002 HC RX W HCPCS: Performed by: PHYSICIAN ASSISTANT

## 2020-09-25 RX ORDER — CYCLOBENZAPRINE HCL 10 MG
10 TABLET ORAL 3 TIMES DAILY PRN
Qty: 21 TABLET | Refills: 0 | Status: ON HOLD | OUTPATIENT
Start: 2020-09-25 | End: 2020-10-01

## 2020-09-25 RX ORDER — NAPROXEN 500 MG/1
500 TABLET ORAL 2 TIMES DAILY
Qty: 14 TABLET | Refills: 0 | Status: ON HOLD | OUTPATIENT
Start: 2020-09-25 | End: 2020-10-01

## 2020-09-25 RX ORDER — KETOROLAC TROMETHAMINE 30 MG/ML
30 INJECTION, SOLUTION INTRAMUSCULAR; INTRAVENOUS ONCE
Status: COMPLETED | OUTPATIENT
Start: 2020-09-25 | End: 2020-09-25

## 2020-09-25 RX ADMIN — KETOROLAC TROMETHAMINE 30 MG: 30 INJECTION, SOLUTION INTRAMUSCULAR at 08:29

## 2020-09-25 ASSESSMENT — PAIN DESCRIPTION - FREQUENCY
FREQUENCY: CONTINUOUS
FREQUENCY: CONTINUOUS

## 2020-09-25 ASSESSMENT — ENCOUNTER SYMPTOMS
CONSTIPATION: 0
ABDOMINAL DISTENTION: 0
SHORTNESS OF BREATH: 0
SORE THROAT: 0
RHINORRHEA: 0
COLOR CHANGE: 0
EYE DISCHARGE: 0
ABDOMINAL PAIN: 0

## 2020-09-25 ASSESSMENT — PAIN SCALES - GENERAL
PAINLEVEL_OUTOF10: 8
PAINLEVEL_OUTOF10: 10
PAINLEVEL_OUTOF10: 5

## 2020-09-25 ASSESSMENT — PAIN DESCRIPTION - LOCATION
LOCATION: HIP
LOCATION: HIP;GROIN

## 2020-09-25 ASSESSMENT — PAIN DESCRIPTION - ORIENTATION
ORIENTATION: RIGHT
ORIENTATION: RIGHT

## 2020-09-25 ASSESSMENT — PAIN DESCRIPTION - DESCRIPTORS
DESCRIPTORS: ACHING
DESCRIPTORS: CONSTANT;THROBBING

## 2020-09-25 ASSESSMENT — PAIN DESCRIPTION - PROGRESSION: CLINICAL_PROGRESSION: GRADUALLY IMPROVING

## 2020-09-25 ASSESSMENT — PAIN DESCRIPTION - PAIN TYPE
TYPE: ACUTE PAIN
TYPE: ACUTE PAIN

## 2020-09-25 NOTE — ED PROVIDER NOTES
[facility] ED  eMERGENCY dEPARTMENT eNCOUnter      Pt Name: Hawk Payne  MRN: [id number]  [place] 1990  Date of evaluation: 9/25/2020  Provider: Aureliano Danielson PA-C    CHIEF COMPLAINT       Chief Complaint   Patient presents with    Hip Pain     right hip; pt states that he was evaluated recently for same         HISTORY OF PRESENT ILLNESS   (Location/Symptom, Timing/Onset,Context/Setting, Quality, Duration, Modifying Factors, Severity)  Note limiting factors. Nikolay Silverio II is a 27 y.o. male who presents to the emergency department with complaint of right hip pain which patient states been ongoing for approximately last 3 weeks. He denies any acute injury. He was seen in the emerge department 1 week ago for the same issue, without acute specific findings, x-rays were completed at that time which showed no acute injury. Patient states that his pain is progressively gotten worse, he states it is in the right groin anteriorly, with no radiation, there is no numbness or tingling, no bowel or bladder dysfunction, increased pain with ambulation, and states that last evening he had a throbbing or intense pain which is been worsening over the last couple days. Denies any fevers. Patient states he did schedule an appointment with orthopedics but is not until 14 October which is been as he was able to get in. HPI    NursingNotes were reviewed. REVIEW OF SYSTEMS    (2-9 systems for level 4, 10 or more for level 5)     Review of Systems   Constitutional: Negative for activity change and appetite change. HENT: Negative for congestion, ear discharge, ear pain, nosebleeds, rhinorrhea and sore throat. Eyes: Negative for discharge. Respiratory: Negative for shortness of breath. Cardiovascular: Negative for chest pain, palpitations and leg swelling. Gastrointestinal: Negative for abdominal distention, abdominal pain and constipation.    Genitourinary: Negative for difficulty urinating and dysuria. Musculoskeletal: Negative for arthralgias. Right hip pain   Skin: Negative for color change, pallor, rash and wound. Neurological: Negative for dizziness, tremors, syncope, weakness, numbness and headaches. Psychiatric/Behavioral: Negative for agitation and confusion. Except as noted above the remainder of the review of systems was reviewed and negative.        PAST MEDICAL HISTORY     Past Medical History:   Diagnosis Date    Aortic stenosis     CAD (coronary artery disease)     [facility]     Hyperlipidemia     Hypertension     PDA (patent ductus arteriosus)     Ulcerative colitis ([facility] [address]     Vertigo     Vertigo 2019         SURGICALHISTORY       Past Surgical History:   Procedure Laterality Date    AORTA SURGERY      x3    CARDIAC SURGERY      PATENT DUCTUS ARTERIOUS LIGATION           CURRENT MEDICATIONS       Discharge Medication List as of 9/25/2020  9:36 AM      CONTINUE these medications which have NOT CHANGED    Details   permethrin (ELIMITE) 5 % cream Apply topically as directed, Disp-1 Tube,R-0, Print      meloxicam (MOBIC) 15 MG tablet Take 1 tablet by mouth daily for 7 days, Disp-7 tablet, R-0Print      dicyclomine (BENTYL) 10 MG capsule Take 1 capsule by mouth every 6 hours as needed (cramps), Disp-12 capsule, R-0Print      ferrous sulfate (IRON 325) 325 (65 Fe) MG tablet Take 1 tablet by mouth 2 times daily, Disp-60 tablet, R-0Print      atorvastatin (LIPITOR) 20 MG tablet Take 1 tablet by mouth daily, Disp-30 tablet, R-5Normal      meclizine (ANTIVERT) 25 MG tablet Take 1 tablet by mouth every 8 hours as needed for Dizziness, Disp-120 tablet, R-5Normal      lisinopril (PRINIVIL;ZESTRIL) 10 MG tablet Take 1 tablet by mouth daily, Disp-30 tablet, R-5Normal      metFORMIN (GLUCOPHAGE) 500 MG tablet Take 2 tablets by mouth 2 times daily (with meals), Disp-1220 tablet, R-5Normal      glucose monitoring kit (FREESTYLE) monitoring kit DAILY Starting Thu 6/13/2019, Disp-1 kit, R-0, Normal      blood glucose monitor strips Test once a day & as needed for symptoms of irregular blood glucose., Disp-30 strip, R-5, Normal      Lancets MISC DAILY Starting Thu 6/13/2019, Disp-300 each, R-1, Normal             ALLERGIES     Latex    FAMILY HISTORY       Family History   Problem Relation Age of Onset    Heart Attack Mother     Heart Disease Mother     High Blood Pressure Mother     Heart Attack Father     Heart Disease Father           SOCIAL HISTORY       Social History     Socioeconomic History    Marital status: Single     Spouse name: None    Number of children: None    Years of education: None    Highest education level: None   Occupational History    None   Social Needs    Financial resource strain: None    Food insecurity     Worry: None     Inability: None    Transportation needs     Medical: None     Non-medical: None   Tobacco Use    Smoking status: Never Smoker    Smokeless tobacco: Never Used   Substance and Sexual Activity    Alcohol use: No    Drug use: No    Sexual activity: Yes     Partners: Female   Lifestyle    Physical activity     Days per week: None     Minutes per session: None    Stress: None   Relationships    Social connections     Talks on phone: None     Gets together: None     Attends Spiritism service: None     Active member of club or organization: None     Attends meetings of clubs or organizations: None     Relationship status: None    Intimate partner violence     Fear of current or ex partner: None     Emotionally abused: None     Physically abused: None     Forced sexual activity: None   Other Topics Concern    None   Social History Narrative    None       SCREENINGS      @FLOW([id number])@      PHYSICAL EXAM    (up to 7 for level 4, 8 or more for level 5)     ED Triage Vitals [09/25/20 0800]   BP Temp Temp Source Pulse Resp SpO2 Height Weight   134/73 98.3 °F (36.8 °C) Temporal 76 19 100 % 5' 6\" (1.676 m) 203 lb (92.1 kg)       Physical Exam  Vitals signs and nursing note reviewed. Constitutional:       General: He is not in acute distress. Appearance: He is well-developed. He is not ill-appearing, toxic-appearing or diaphoretic. HENT:      Head: Normocephalic. Right Ear: Tympanic membrane normal.      Left Ear: Tympanic membrane normal.      Nose: Nose normal. No congestion. Mouth/Throat:      Mouth: Mucous membranes are moist.      Pharynx: No oropharyngeal exudate or posterior oropharyngeal erythema. Eyes:      Extraocular Movements: Extraocular movements intact. Conjunctiva/sclera: Conjunctivae normal.      Pupils: Pupils are equal, round, and reactive to light. Neck:      Musculoskeletal: Normal range of motion and neck supple. No neck rigidity. Vascular: No JVD. Trachea: No tracheal deviation. Cardiovascular:      Rate and Rhythm: Normal rate. Pulses: Normal pulses. Heart sounds: Normal heart sounds. No murmur. No friction rub. No gallop. Pulmonary:      Effort: Pulmonary effort is normal. No tachypnea, accessory muscle usage, respiratory distress or retractions. Breath sounds: No stridor. No wheezing, rhonchi or rales. Chest:      Chest wall: No tenderness. Abdominal:      General: Abdomen is flat. Bowel sounds are normal. There is no distension or abdominal bruit. Palpations: There is no shifting dullness, fluid wave, hepatomegaly, splenomegaly, mass or pulsatile mass. Tenderness: There is no abdominal tenderness. There is no right CVA tenderness, left CVA tenderness, guarding or rebound. Negative signs include Cuenca's sign, Rovsing's sign and McBurney's sign. Musculoskeletal:         General: No deformity. Legs:       Comments: Patient has pain on palpation to the anterior right hip an area of the groin mid groin no pulsatile masses, no crepitus, no shortening or rotation. Increased pain with movement of the hip.   There is no warmth, no crepitus is noted. Skin:     General: Skin is warm and dry. Capillary Refill: Capillary refill takes less than 2 seconds. Coloration: Skin is not jaundiced. Neurological:      General: No focal deficit present. Mental Status: He is alert and oriented to person, place, and time. Mental status is at baseline. Cranial Nerves: No cranial nerve deficit. Sensory: No sensory deficit. Motor: No weakness. Coordination: Coordination normal.   Psychiatric:         Mood and Affect: Mood normal.         DIAGNOSTIC RESULTS     EKG: All EKG's are interpreted by the Emergency Department Physician who either signs or Co-signsthis chart in the absence of a cardiologist.        RADIOLOGY:   Araseli Comes such as CT, Ultrasound and MRI are read by the radiologist. Plain radiographic images are visualized and preliminarily interpreted by the emergency physician with the below findings:        Interpretation per the Radiologist below, if available at the time ofthis note:    CT HIP RIGHT WO CONTRAST   Final Result      Ian Diamond [facility]. ED BEDSIDE ULTRASOUND:   Performed by ED Physician - none    LABS:  Labs Reviewed - No data to display    All other labs were within normal range or not returned as of this dictation. EMERGENCY DEPARTMENT COURSE and DIFFERENTIAL DIAGNOSIS/MDM:   Vitals:    Vitals:    09/25/20 0800   BP: 134/73   Pulse: 76   Resp: 19   Temp: 98.3 °F (36.8 °C)   TempSrc: Temporal   SpO2: 100%   Weight: 203 lb (92.1 kg)   Height: 5' 6\" (1.676 m)          MDM  Number of Diagnoses or Management Options  Right hip pain:   Diagnosis management comments: Patient presented to ED with complaint of right hip pain which he states been ongoing for last 3 weeks, with no acute injury. He was seen in the ER approximately 1 week ago for very similar issues, he had x-rays completed that time without any acute findings.   He was referred to orthopedics, he states he was unable to get in with orthopedics until the 14th of next month. CT scan of the hip was completed today due to increased worsening pain which showed no acute process. He was given a prescription for Naprosyn and Flexeril, he advised that he will [facility]-up with [facility], I advised him that you have a walk-in clinic and if he wanted to he could go today as a walk-in patient. He advised that he will do that. He was advised if any worsening or changes symptoms return to the ER. CRITICAL CARE TIME   Total Critical Care time was 0 minutes, excluding separately reportableprocedures. There was a high probability of clinicallysignificant/life threatening deterioration in the patient's condition which required my urgent intervention. CONSULTS:  None    PROCEDURES:  Unless otherwise noted below, none     Procedures    FINAL IMPRESSION      1.  Right hip pain          DISPOSITION/PLAN   DISPOSITION        PATIENT REFERRED TO:  Fatmata Abbasi MD  [address]  [phone]    In 3 days      Dontrell Xavier MD  [address]  [facility] [id number] [phone]    Today  follow up with walk in clinic today      DISCHARGE MEDICATIONS:  Discharge Medication List as of 9/25/2020  9:36 AM      START taking these medications    Details   naproxen (NAPROSYN) 500 MG tablet Take 1 tablet by mouth 2 times daily for 7 days, Disp-14 tablet,R-0Print      cyclobenzaprine (FLEXERIL) 10 MG tablet Take 1 tablet by mouth 3 times daily as needed for Muscle spasms, Disp-21 tablet,R-0Print                (Please note that portions of this note were completed with a voice recognition program.  Efforts were made to edit the dictations but occasionally words are mis-transcribed.)    Yamilka Madden PA-C (electronically signed)  Attending Emergency Physician         Yamilka Madden PA-C  09/25/20 [address] Preet Cook PA-C  09/25/20 [id number]

## 2020-09-25 NOTE — ED NOTES
Discharge  instructions given and reviewed. Patient verbalized understanding. Patient ambulated out of ED with a steady gait to POV.      Hattie Navarro RN  09/25/20 2187

## 2020-09-25 NOTE — ED TRIAGE NOTES
Pt presents to ED from home with c/o right hip pain. Pt states this has been an ongoing issue for him and that he was previously evaluated in this ED and sent home w/pain medication. Pt states that his pain is worsening and he now walks with a limp. Pt also c/o worsening pain in right groin from a hernia. Upon assessment, pt is A/Ox4, skin p/w/d, resp even and unlabored, msp's intact. Pt denies cp, sob, n/v/d, fever, or chills. Steady gait noted w/ambulation.

## 2020-10-01 ENCOUNTER — HOSPITAL ENCOUNTER (INPATIENT)
Age: 30
LOS: 1 days | Discharge: LEFT BEFORE DISCHARGE | DRG: 394 | End: 2020-10-02
Attending: EMERGENCY MEDICINE | Admitting: INTERNAL MEDICINE
Payer: COMMERCIAL

## 2020-10-01 PROBLEM — D64.9 ANEMIA: Status: ACTIVE | Noted: 2020-10-01

## 2020-10-01 LAB
ABO/RH: NORMAL
ABO/RH: NORMAL
ALBUMIN SERPL-MCNC: 4.3 G/DL (ref 3.5–4.6)
ALP BLD-CCNC: 63 U/L (ref 35–104)
ALT SERPL-CCNC: 10 U/L (ref 0–41)
ANION GAP SERPL CALCULATED.3IONS-SCNC: 8 MEQ/L (ref 9–15)
ANISOCYTOSIS: ABNORMAL
ANTIBODY SCREEN: NORMAL
AST SERPL-CCNC: 11 U/L (ref 0–40)
BASOPHILS ABSOLUTE: 0.1 K/UL (ref 0–0.2)
BASOPHILS RELATIVE PERCENT: 0.6 %
BILIRUB SERPL-MCNC: 0.3 MG/DL (ref 0.2–0.7)
BILIRUBIN URINE: NEGATIVE
BLOOD BANK DISPENSE STATUS: NORMAL
BLOOD BANK PRODUCT CODE: NORMAL
BLOOD, URINE: NEGATIVE
BPU ID: NORMAL
BUN BLDV-MCNC: 12 MG/DL (ref 6–20)
C-REACTIVE PROTEIN: 4.2 MG/L (ref 0–5)
CALCIUM SERPL-MCNC: 9.1 MG/DL (ref 8.5–9.9)
CHLORIDE BLD-SCNC: 107 MEQ/L (ref 95–107)
CLARITY: CLEAR
CO2: 24 MEQ/L (ref 20–31)
COLOR: YELLOW
CREAT SERPL-MCNC: 0.78 MG/DL (ref 0.7–1.2)
DESCRIPTION BLOOD BANK: NORMAL
EOSINOPHILS ABSOLUTE: 0.3 K/UL (ref 0–0.7)
EOSINOPHILS RELATIVE PERCENT: 4.1 %
FERRITIN: 3.3 NG/ML (ref 30–400)
FOLATE: 17.9 NG/ML (ref 7.3–26.1)
GFR AFRICAN AMERICAN: >60
GFR NON-AFRICAN AMERICAN: >60
GLOBULIN: 3.5 G/DL (ref 2.3–3.5)
GLUCOSE BLD-MCNC: 103 MG/DL (ref 70–99)
GLUCOSE BLD-MCNC: 97 MG/DL (ref 60–115)
GLUCOSE URINE: NEGATIVE MG/DL
HCT VFR BLD CALC: 23.9 % (ref 42–52)
HCT VFR BLD CALC: 24.9 % (ref 42–52)
HCT VFR BLD CALC: 26.5 % (ref 42–52)
HEMOGLOBIN: 6.7 G/DL (ref 14–18)
HEMOGLOBIN: 7.1 G/DL (ref 14–18)
HEMOGLOBIN: 7.6 G/DL (ref 14–18)
HYPOCHROMIA: ABNORMAL
IRON SATURATION: 2 % (ref 11–46)
IRON: 10 UG/DL (ref 59–158)
KETONES, URINE: NEGATIVE MG/DL
LEUKOCYTE ESTERASE, URINE: NEGATIVE
LYMPHOCYTES ABSOLUTE: 1.6 K/UL (ref 1–4.8)
LYMPHOCYTES RELATIVE PERCENT: 20 %
MACROCYTES: ABNORMAL
MCH RBC QN AUTO: 16.6 PG (ref 27–31.3)
MCHC RBC AUTO-ENTMCNC: 28.6 % (ref 33–37)
MCV RBC AUTO: 58 FL (ref 80–100)
MICROCYTES: ABNORMAL
MONOCYTES ABSOLUTE: 0.9 K/UL (ref 0.2–0.8)
MONOCYTES RELATIVE PERCENT: 11.5 %
NEUTROPHILS ABSOLUTE: 5 K/UL (ref 1.4–6.5)
NEUTROPHILS RELATIVE PERCENT: 63.8 %
NITRITE, URINE: NEGATIVE
OVALOCYTES: ABNORMAL
PDW BLD-RTO: 17.8 % (ref 11.5–14.5)
PERFORMED ON: NORMAL
PH UA: 5 (ref 5–9)
PLATELET # BLD: 438 K/UL (ref 130–400)
PLATELET SLIDE REVIEW: ABNORMAL
POIKILOCYTES: ABNORMAL
POTASSIUM SERPL-SCNC: 4.1 MEQ/L (ref 3.4–4.9)
PROTEIN UA: NEGATIVE MG/DL
RBC # BLD: 4.29 M/UL (ref 4.7–6.1)
SEDIMENTATION RATE, ERYTHROCYTE: 12 MM (ref 0–10)
SODIUM BLD-SCNC: 139 MEQ/L (ref 135–144)
SPECIFIC GRAVITY UA: 1.02 (ref 1–1.03)
TOTAL IRON BINDING CAPACITY: 414 UG/DL (ref 178–450)
TOTAL PROTEIN: 7.8 G/DL (ref 6.3–8)
URINE REFLEX TO CULTURE: NORMAL
UROBILINOGEN, URINE: 0.2 E.U./DL
VITAMIN B-12: 521 PG/ML (ref 232–1245)
WBC # BLD: 7.8 K/UL (ref 4.8–10.8)

## 2020-10-01 PROCEDURE — 2580000003 HC RX 258: Performed by: EMERGENCY MEDICINE

## 2020-10-01 PROCEDURE — 85014 HEMATOCRIT: CPT

## 2020-10-01 PROCEDURE — 51798 US URINE CAPACITY MEASURE: CPT

## 2020-10-01 PROCEDURE — 36415 COLL VENOUS BLD VENIPUNCTURE: CPT

## 2020-10-01 PROCEDURE — 6370000000 HC RX 637 (ALT 250 FOR IP): Performed by: INTERNAL MEDICINE

## 2020-10-01 PROCEDURE — 86850 RBC ANTIBODY SCREEN: CPT

## 2020-10-01 PROCEDURE — 6360000002 HC RX W HCPCS: Performed by: EMERGENCY MEDICINE

## 2020-10-01 PROCEDURE — 1210000000 HC MED SURG R&B

## 2020-10-01 PROCEDURE — 99253 IP/OBS CNSLTJ NEW/EST LOW 45: CPT | Performed by: INTERNAL MEDICINE

## 2020-10-01 PROCEDURE — 99284 EMERGENCY DEPT VISIT MOD MDM: CPT

## 2020-10-01 PROCEDURE — 85018 HEMOGLOBIN: CPT

## 2020-10-01 PROCEDURE — 99222 1ST HOSP IP/OBS MODERATE 55: CPT | Performed by: SURGERY

## 2020-10-01 PROCEDURE — 83550 IRON BINDING TEST: CPT

## 2020-10-01 PROCEDURE — 82607 VITAMIN B-12: CPT

## 2020-10-01 PROCEDURE — 80053 COMPREHEN METABOLIC PANEL: CPT

## 2020-10-01 PROCEDURE — 82728 ASSAY OF FERRITIN: CPT

## 2020-10-01 PROCEDURE — 85025 COMPLETE CBC W/AUTO DIFF WBC: CPT

## 2020-10-01 PROCEDURE — 86923 COMPATIBILITY TEST ELECTRIC: CPT

## 2020-10-01 PROCEDURE — 36430 TRANSFUSION BLD/BLD COMPNT: CPT

## 2020-10-01 PROCEDURE — 82746 ASSAY OF FOLIC ACID SERUM: CPT

## 2020-10-01 PROCEDURE — 87591 N.GONORRHOEAE DNA AMP PROB: CPT

## 2020-10-01 PROCEDURE — 86900 BLOOD TYPING SEROLOGIC ABO: CPT

## 2020-10-01 PROCEDURE — 96372 THER/PROPH/DIAG INJ SC/IM: CPT

## 2020-10-01 PROCEDURE — 86140 C-REACTIVE PROTEIN: CPT

## 2020-10-01 PROCEDURE — 2580000003 HC RX 258: Performed by: INTERNAL MEDICINE

## 2020-10-01 PROCEDURE — 83540 ASSAY OF IRON: CPT

## 2020-10-01 PROCEDURE — C9113 INJ PANTOPRAZOLE SODIUM, VIA: HCPCS | Performed by: EMERGENCY MEDICINE

## 2020-10-01 PROCEDURE — 85652 RBC SED RATE AUTOMATED: CPT

## 2020-10-01 PROCEDURE — 87491 CHLMYD TRACH DNA AMP PROBE: CPT

## 2020-10-01 PROCEDURE — 81003 URINALYSIS AUTO W/O SCOPE: CPT

## 2020-10-01 PROCEDURE — P9016 RBC LEUKOCYTES REDUCED: HCPCS

## 2020-10-01 PROCEDURE — 86901 BLOOD TYPING SEROLOGIC RH(D): CPT

## 2020-10-01 RX ORDER — DEXTROSE MONOHYDRATE 50 MG/ML
100 INJECTION, SOLUTION INTRAVENOUS PRN
Status: DISCONTINUED | OUTPATIENT
Start: 2020-10-01 | End: 2020-10-02 | Stop reason: HOSPADM

## 2020-10-01 RX ORDER — SODIUM CHLORIDE 0.9 % (FLUSH) 0.9 %
10 SYRINGE (ML) INJECTION PRN
Status: DISCONTINUED | OUTPATIENT
Start: 2020-10-01 | End: 2020-10-02 | Stop reason: HOSPADM

## 2020-10-01 RX ORDER — KETOROLAC TROMETHAMINE 30 MG/ML
60 INJECTION, SOLUTION INTRAMUSCULAR; INTRAVENOUS ONCE
Status: COMPLETED | OUTPATIENT
Start: 2020-10-01 | End: 2020-10-01

## 2020-10-01 RX ORDER — PANTOPRAZOLE SODIUM 40 MG/10ML
40 INJECTION, POWDER, LYOPHILIZED, FOR SOLUTION INTRAVENOUS DAILY
Status: DISCONTINUED | OUTPATIENT
Start: 2020-10-02 | End: 2020-10-02 | Stop reason: HOSPADM

## 2020-10-01 RX ORDER — POLYETHYLENE GLYCOL 3350 17 G/17G
17 POWDER, FOR SOLUTION ORAL DAILY PRN
Status: DISCONTINUED | OUTPATIENT
Start: 2020-10-01 | End: 2020-10-02 | Stop reason: HOSPADM

## 2020-10-01 RX ORDER — SODIUM CHLORIDE 9 MG/ML
INJECTION, SOLUTION INTRAVENOUS CONTINUOUS
Status: DISCONTINUED | OUTPATIENT
Start: 2020-10-01 | End: 2020-10-02 | Stop reason: HOSPADM

## 2020-10-01 RX ORDER — PEG-3350, SODIUM SULFATE, SODIUM CHLORIDE, POTASSIUM CHLORIDE, SODIUM ASCORBATE AND ASCORBIC ACID 7.5-2.691G
100 KIT ORAL ONCE
Status: COMPLETED | OUTPATIENT
Start: 2020-10-01 | End: 2020-10-01

## 2020-10-01 RX ORDER — SODIUM CHLORIDE 0.9 % (FLUSH) 0.9 %
10 SYRINGE (ML) INJECTION EVERY 12 HOURS SCHEDULED
Status: DISCONTINUED | OUTPATIENT
Start: 2020-10-01 | End: 2020-10-02 | Stop reason: HOSPADM

## 2020-10-01 RX ORDER — PROMETHAZINE HYDROCHLORIDE 12.5 MG/1
12.5 TABLET ORAL EVERY 6 HOURS PRN
Status: DISCONTINUED | OUTPATIENT
Start: 2020-10-01 | End: 2020-10-02 | Stop reason: HOSPADM

## 2020-10-01 RX ORDER — ACETAMINOPHEN 650 MG/1
650 SUPPOSITORY RECTAL EVERY 6 HOURS PRN
Status: DISCONTINUED | OUTPATIENT
Start: 2020-10-01 | End: 2020-10-02 | Stop reason: HOSPADM

## 2020-10-01 RX ORDER — DEXTROSE MONOHYDRATE 25 G/50ML
12.5 INJECTION, SOLUTION INTRAVENOUS PRN
Status: DISCONTINUED | OUTPATIENT
Start: 2020-10-01 | End: 2020-10-02 | Stop reason: HOSPADM

## 2020-10-01 RX ORDER — SODIUM CHLORIDE 9 MG/ML
10 INJECTION INTRAVENOUS DAILY
Status: DISCONTINUED | OUTPATIENT
Start: 2020-10-01 | End: 2020-10-02 | Stop reason: HOSPADM

## 2020-10-01 RX ORDER — ONDANSETRON 2 MG/ML
4 INJECTION INTRAMUSCULAR; INTRAVENOUS EVERY 6 HOURS PRN
Status: DISCONTINUED | OUTPATIENT
Start: 2020-10-01 | End: 2020-10-02 | Stop reason: HOSPADM

## 2020-10-01 RX ORDER — ACETAMINOPHEN 325 MG/1
650 TABLET ORAL EVERY 6 HOURS PRN
Status: DISCONTINUED | OUTPATIENT
Start: 2020-10-01 | End: 2020-10-02 | Stop reason: HOSPADM

## 2020-10-01 RX ORDER — 0.9 % SODIUM CHLORIDE 0.9 %
20 INTRAVENOUS SOLUTION INTRAVENOUS ONCE
Status: COMPLETED | OUTPATIENT
Start: 2020-10-01 | End: 2020-10-01

## 2020-10-01 RX ORDER — NICOTINE POLACRILEX 4 MG
15 LOZENGE BUCCAL PRN
Status: DISCONTINUED | OUTPATIENT
Start: 2020-10-01 | End: 2020-10-02 | Stop reason: HOSPADM

## 2020-10-01 RX ADMIN — SODIUM CHLORIDE 20 ML: 9 INJECTION, SOLUTION INTRAVENOUS at 12:29

## 2020-10-01 RX ADMIN — SODIUM CHLORIDE: 9 INJECTION, SOLUTION INTRAVENOUS at 20:28

## 2020-10-01 RX ADMIN — POLYETHYLENE GLYCOL 3350, SODIUM SULFATE, SODIUM CHLORIDE, POTASSIUM CHLORIDE, ASCORBIC ACID, SODIUM ASCORBATE 100 G: KIT at 18:17

## 2020-10-01 RX ADMIN — KETOROLAC TROMETHAMINE 60 MG: 30 INJECTION, SOLUTION INTRAMUSCULAR at 07:45

## 2020-10-01 RX ADMIN — Medication 10 ML: at 20:28

## 2020-10-01 RX ADMIN — SODIUM CHLORIDE 80 MG: 9 INJECTION, SOLUTION INTRAVENOUS at 09:20

## 2020-10-01 RX ADMIN — Medication 10 ML: at 12:31

## 2020-10-01 ASSESSMENT — ENCOUNTER SYMPTOMS
SORE THROAT: 0
CHEST TIGHTNESS: 0
EYE PAIN: 0
VOMITING: 0
SHORTNESS OF BREATH: 1
ABDOMINAL PAIN: 1
NAUSEA: 0

## 2020-10-01 ASSESSMENT — PAIN DESCRIPTION - DESCRIPTORS
DESCRIPTORS: THROBBING
DESCRIPTORS: THROBBING;TENDER

## 2020-10-01 ASSESSMENT — PAIN DESCRIPTION - ORIENTATION
ORIENTATION: RIGHT
ORIENTATION: RIGHT

## 2020-10-01 ASSESSMENT — PAIN DESCRIPTION - LOCATION
LOCATION: GROIN
LOCATION: GROIN

## 2020-10-01 ASSESSMENT — PAIN DESCRIPTION - FREQUENCY
FREQUENCY: CONTINUOUS
FREQUENCY: CONTINUOUS

## 2020-10-01 ASSESSMENT — PAIN SCALES - GENERAL
PAINLEVEL_OUTOF10: 10
PAINLEVEL_OUTOF10: 0

## 2020-10-01 ASSESSMENT — PAIN DESCRIPTION - PAIN TYPE
TYPE: ACUTE PAIN
TYPE: ACUTE PAIN

## 2020-10-01 NOTE — ED NOTES
Pt ambulatory to bathroom w/steady gait in attempt to provide urine specimen.      Yeimi Brice RN  10/01/20 2022

## 2020-10-01 NOTE — CONSULTS
Consult to Gastroenterology  Consult performed by: Johnnie Serrano MD  Consult ordered by: Alissa Baron MD        Patient Name: Emmy Fuentes Date: 10/1/2020  7:13 AM  MR #: [id number]  : 1990    Attending Physician: Alissa Baron MD  Reason for consult: Anemia     History of Presenting Illness:      Rosette Chan is a 27 y.o. male on hospital day 0 with a history of UC, CAD, Aortic stenosis, HLD, HTN. History Obtained From:  patient  Patient reports rectal bleeding on and off for the past two years. + rectal pain on occasion. Stool is soft/foremed. Has 1-3 BMs p/d. No F/C. +lower abd pain before a BM, resolves after a BM. Patient diagnosed with UC in . Reports trying medication for UC at that time, but had a \"bad reaction\" to the medication. He is not sure what medication it was. Has never tried biologics. RLQ pain, worse today. No melena or hematemesis. No weight loss or loss of appetite. No complaints of any new eye infections or skin rashes. Does experience Right hip pain, but this is chronic. No CP, SOB, or palpitations.      History:      Past Medical History:   Diagnosis Date    Aortic stenosis     CAD (coronary artery disease)     Diabetes mellitus ([address]     Hyperlipidemia     Hypertension     PDA (patent ductus arteriosus)     Ulcerative colitis ([facility] [address]     Vertigo     Vertigo 2019     Past Surgical History:   Procedure Laterality Date    AORTA SURGERY      x3    CARDIAC SURGERY      PATENT DUCTUS ARTERIOUS LIGATION       Family History  Family History   Problem Relation Age of Onset    Heart Attack Mother     Heart Disease Mother     High Blood Pressure Mother     Heart Attack Father     Heart Disease Father      Social History     Socioeconomic History    Marital status: Single     Spouse name: Not on file    Number of children: Not on file    Years of education: Not on file    Highest education level: Not on file   Occupational History    Not on file Social Needs    Financial resource strain: Not on file    Food insecurity     Worry: Not on file     Inability: Not on file    Transportation needs     Medical: Not on file     Non-medical: Not on file   Tobacco Use    Smoking status: Never Smoker    Smokeless tobacco: Never Used   Substance and Sexual Activity    Alcohol use: No    Drug use: No    Sexual activity: Yes     Partners: Female   Lifestyle    Physical activity     Days per week: Not on file     Minutes per session: Not on file    Stress: Not on file   Relationships    Social connections     Talks on phone: Not on file     Gets together: Not on file     Attends Voodoo service: Not on file     Active member of club or organization: Not on file     Attends meetings of clubs or organizations: Not on file     Relationship status: Not on file    Intimate partner violence     Fear of current or ex partner: Not on file     Emotionally abused: Not on file     Physically abused: Not on file     Forced sexual activity: Not on file   Other Topics Concern    Not on file   Social History Narrative    Not on file        Home Medications:      Medications Prior to Admission: naproxen (NAPROSYN) 500 MG tablet, Take 1 tablet by mouth 2 times daily for 7 days  cyclobenzaprine (FLEXERIL) 10 MG tablet, Take 1 tablet by mouth 3 times daily as needed for Muscle spasms  permethrin (ELIMITE) 5 % cream, Apply topically as directed  meloxicam (MOBIC) 15 MG tablet, Take 1 tablet by mouth daily for 7 days  dicyclomine (BENTYL) 10 MG capsule, Take 1 capsule by mouth every 6 hours as needed (cramps)  ferrous sulfate (IRON 325) 325 (65 Fe) MG tablet, Take 1 tablet by mouth 2 times daily  atorvastatin (LIPITOR) 20 MG tablet, Take 1 tablet by mouth daily  meclizine (ANTIVERT) 25 MG tablet, Take 1 tablet by mouth every 8 hours as needed for Dizziness  lisinopril (PRINIVIL;ZESTRIL) 10 MG tablet, Take 1 tablet by mouth daily  metFORMIN (GLUCOPHAGE) 500 MG tablet, Take 2 tablets by mouth 2 times daily (with meals) (Patient taking differently: Take 500 mg by mouth 2 times daily (with meals) )  glucose monitoring kit (FREESTYLE) monitoring kit, 1 kit by Does not apply route daily  blood glucose monitor strips, Test once a day & as needed for symptoms of irregular blood glucose. Lancets MISC, 1 each by Does not apply route daily    Current Hospital Medications:   Scheduled Meds:   sodium chloride  20 mL Intravenous Once    sodium chloride flush  10 mL Intravenous 2 times per day    [START ON 10/2/2020] pantoprazole  40 mg Intravenous Daily    And    sodium chloride (PF)  10 mL Intravenous Daily     Continuous Infusions:   sodium chloride       PRN Meds:.sodium chloride flush, acetaminophen **OR** acetaminophen, polyethylene glycol, promethazine **OR** ondansetron   sodium chloride        Allergies: Allergies   Allergen Reactions    Latex Anaphylaxis      Review of Systems:       [x] CV, Resp, Neuro, , and all other systems reviewed and negative other than listed in HPI.       Objective Findings:     Vitals:   Vitals:    10/01/20 0710 10/01/20 0829   BP: 124/83 121/81   Pulse: 70 71   Resp: 17 16   Temp: 98.2 °F (36.8 °C)    TempSrc: Temporal    SpO2: 100% 98%   Weight: 203 lb (92.1 kg)    Height: 5' 6\" (1.676 m)       Physical Examination:  General: no apparent distress, A/O x3  HEENT: Normocephalic, no scleral icterus. Neck: No JVD. Heart: Regular, no murmur, no rub/gallop. No RV heave. Lungs: Clear to ascultation, no rales/wheezing/rhonchi. Good chest wall excursion. Abdomen: Appearance:, Distension -none, Soft , tenderness -RLQ, Bowel sounds normal  Extremities: No clubbing/cyanosis, no edema. Skin: Warm, dry, normal turgor, no rash, no bruise, no petichiae. Neuro: No myoclonus or tremor.    Psych: Normal affect    Results/ Medications reviewed 10/1/2020, 11:02 AM     Laboratory, Microbiology, Pathology, Radiology, Cardiology, Medications and Transcriptions reviewed  Scheduled Meds:   sodium chloride  20 mL Intravenous Once    sodium chloride flush  10 mL Intravenous 2 times per day    [START ON 10/2/2020] pantoprazole  40 mg Intravenous Daily    And    sodium chloride (PF)  10 mL Intravenous Daily     Continuous Infusions:   sodium chloride         Recent Labs     10/01/20  0745   WBC 7.8   HGB 7.1*   HCT 24.9*   MCV 58.0*   *     Recent Labs     10/01/20  0745      K 4.1      CO2 24   BUN 12   CREATININE 0.78     Recent Labs     10/01/20  0745   AST 11   ALT 10   BILITOT 0.3   ALKPHOS 63     No results for input(s): LIPASE, AMYLASE in the last 72 hours. Recent Labs     10/01/20  0745   PROT 7.8     Xr Hip Right (2-3 Views)  Result Date: 9/16/2020                                                       NO ACUTE FRACTURE. Ct Hip Right Wo Contrast  Result Date: 9/25/2020  ESSENTIALLY NEGATIVE RIGHT HIP CT. EGD 2013- normal esophagus, gastritis, medium amount of food found in the stomach. Normal examined duodenum. Impression:   27-year-old male patient admitted with anemia, abdominal pain, and rectal bleeding. Baseline hemoglobin 15. Noted drop in hemoglobin in July, 2022 7.7. Hemoglobin on admission 7.1, MCV 58.0, platelets 486. Patient reports being diagnosed with ulcerative colitis in 2013, no colonoscopy report available. Currently not on any treatment for this. Biologic naïve. Patient with rectal bleeding, but no diarrhea with increased frequency. EGD in 2013 with findings of a normal esophagus, gastritis, medium on amount of food in the stomach and a normal examined duodenum. Differential diagnosis: IBD flare, hemorrhoidal bleeding PUD/gastritis, poor nutrition. Plan:   -Continue course of care  -Transfuse blood to keep hemoglobin above 7  -Iron infusion  -Colonoscopy in AM  -Clear liquid diet today  -MoviPrep to start tonight at 1800  -N.p.o. after midnight, except for bowel prep  Comments:      Thank you for allowing us to participate in the care of this patient. Will continue to follow. Please call if questions or concerns arise. Electronically signed by Leonor Thornton MD on 10/1/2020 at 11:02 AM    Please note this report has been partially produced using speech recognition software and may cause contain errors related to that system including grammar, punctuation and spelling as well as words and phrases that may seem inappropriate. If there are questions or concerns please feel free to contact me to clarify.

## 2020-10-01 NOTE — ED NOTES
Dr Mono Patiño at bedside for hemocult test, results are negative, Pt tolerated well.      Regina Farmer RN  10/01/20 7866

## 2020-10-01 NOTE — H&P
Hospital Medicine  History and Physical    Patient:  Litzy Lee  MRN: [id number]    CHIEF COMPLAINT:    Chief Complaint   Patient presents with    Hernia     pt states that he has multiple known hernias and is c/o severe pain in right groin       History Obtained From:  Patient, EMR  Primary Care Physician: Pamela Castanon MD    HISTORY OF PRESENT ILLNESS:   The patient is a 27 y.o. male with PMH of PDA s/p closure, congenital subaortic stenosis s/p resection x 2, bicuspid aortic valve, HTN, UC, DM2, inguinal hernia who presented with the above CC. Patient has been experiencing right groin pain for the last few months, CT of the abd/pelvis on 7/8 showed small bilateral hernias, noted to anemic with Hb-7.1 on arrival, has rectal bleeding with BMs on a daily basis, case was discussed with GI service, IV Protonix was administered, one unit of PRBCs ordered and patient was admitted for further management. Past Medical History:      Diagnosis Date    Aortic stenosis     CAD (coronary artery disease)     [facility] mellitus [facility] [address]     Hyperlipidemia     Hypertension     PDA (patent ductus arteriosus)     Ulcerative colitis ([facility] [address]     Vertigo     Vertigo 2019       Past Surgical History:      Procedure Laterality Date    AORTA SURGERY      x3    CARDIAC SURGERY      PATENT DUCTUS ARTERIOUS LIGATION         Medications Prior to Admission:    Prior to Admission medications    Medication Sig Start Date End Date Taking?  Authorizing Provider   naproxen (NAPROSYN) 500 MG tablet Take 1 tablet by mouth 2 times daily for 7 days 9/25/20 10/2/20  Pacheco Barfield PA-C   cyclobenzaprine (FLEXERIL) 10 MG tablet Take 1 tablet by mouth 3 times daily as needed for Muscle spasms 9/25/20 10/2/20  Koffi Tucker PA-C   permethrin (ELIMITE) 5 % cream Apply topically as directed 7/30/20   NICOLE Doherty CNP   meloxicam (MOBIC) 15 MG tablet Take 1 tablet by mouth daily for 7 days 7/8/20 7/15/20  Rd Hickman DO   dicyclomine (BENTYL) 10 MG capsule Take 1 capsule by mouth every 6 hours as needed (cramps) 7/8/20   Nichol Nickel CLIFF Hickman, DO   ferrous sulfate (IRON 325) 325 (65 Fe) MG tablet Take 1 tablet by mouth 2 times daily 7/8/20   Nichol Nickel A Marylou, DO   atorvastatin (LIPITOR) 20 MG tablet Take 1 tablet by mouth daily 6/13/19 1/1/20  Harshad Martínez MD   meclizine (ANTIVERT) 25 MG tablet Take 1 tablet by mouth every 8 hours as needed for Dizziness 6/13/19   Harshad Martínez MD   lisinopril (PRINIVIL;ZESTRIL) 10 MG tablet Take 1 tablet by mouth daily 6/13/19   Harshad Martínez MD   metFORMIN (GLUCOPHAGE) 500 MG tablet Take 2 tablets by mouth 2 times daily (with meals)  Patient taking differently: Take 500 mg by mouth 2 times daily (with meals)  6/13/19 1/1/20  Harshad Martínez MD   glucose monitoring kit (FREESTYLE) monitoring kit 1 kit by Does not apply route daily 6/13/19   Harshad Martínez MD   blood glucose monitor strips Test once a day & as needed for symptoms of irregular blood glucose. 6/13/19   Harshad Martínez MD   Lancets MISC 1 each by Does not apply route daily 6/13/19   Harshad Martínez MD       Allergies:  Latex    Social History:   TOBACCO:   reports that he has never smoked. He has never used smokeless tobacco.  ETOH:   reports no history of alcohol use. Family History:       Problem Relation Age of Onset    Heart Attack Mother     Heart Disease Mother     High Blood Pressure Mother     Heart Attack Father     Heart Disease Father        REVIEW OF SYSTEMS:  Ten systems reviewed and negative except for stated in HPI    Physical Exam:    Vitals: /81   Pulse 71   Temp 98.2 °F (36.8 °C) (Temporal)   Resp 16   Ht 5' 6\" (1.676 m)   Wt 203 lb (92.1 kg)   SpO2 98%   BMI 32.77 kg/m²   General appearance: alert, appears stated age and cooperative,   Skin: Skin color, texture, turgor normal.   HEENT: Head: Normocephalic, no lesions, without obvious abnormality.   Eye: Normal external eye, conjunctiva, lids cornea, REGINALDO.  Neck: supple, symmetrical, trachea midline  Lungs: clear to auscultation bilaterally  Heart: regular rate and rhythm and S1, S2 normal  Abdomen: soft, BS active  Extremities: no edema  Neurologic: Mental status: Alert, oriented, thought content appropriate     Recent Labs     10/01/20  0745   WBC 7.8   HGB 7.1*   *     Recent Labs     10/01/20  0745      K 4.1      CO2 24   BUN 12   CREATININE 0.78   GLUCOSE 103*   AST 11   ALT 10   BILITOT 0.3   ALKPHOS 63     Troponin T: No results for input(s): TROPONINI in the last 72 hours. ABGs: No results found for: PHART, PO2ART, VUJ0THO  INR: No results for input(s): INR in the last 72 hours. URINALYSIS:  Recent Labs     10/01/20  0745   COLORU Yellow   PHUR 5.0   CLARITYU Clear   SPECGRAV 1.022   LEUKOCYTESUR Negative   UROBILINOGEN 0.2   BILIRUBINUR Negative   BLOODU Negative   GLUCOSEU Negative     -----------------------------------------------------------------   No results found.         Assessment and Plan     27 y.o. male with PMH of PDA s/p closure, congenital subaortic stenosis s/p resection x 2, bicuspid aortic valve, HTN, UC, DM2, microcytic anemia, inguinal hernia who presented with:    Acute / chronic blood loss anemia   - in the setting of untreated ulcerative colitis, NSAIDS  - continue IV Protonix,   - stop NSAIDS  - one unit of PRBCs will be administered  - NPO after midnight  - plan for endoscopy tomorrow per GI service    Right groin pain  - ongoing for the last few months,   - CT of the abd/pelvis on 7/8 showed small bilateral hernias,   - surgical service consulted for further evaluation    DM2  - ISS, hypoglycemia protocol    HTN  - hold BP meds   - monitor BP        Lottie Hodgkins, MD  Admitting Hospitalist

## 2020-10-01 NOTE — CONSULTS
Pt Name: Cindy Matson  MRN: [id number]  [place]: 1990  Date of evaluation: 10/1/2020  Primary Care Physician: Vandana Roth MD  Patient evaluated at the request of  Dr. Shyam Wright  Reason for evaluation: anemia and RIH    IMPRESSIONS:  1. Ulcerative colitis with rectal bleeding  2. Acute and chronic blood loss anemia  3. Symptomatic right inguinal hernia    PLANS  1. EGD and colonoscopy tomorrow by gastroenterology  2. Elective hernia repair  3. Further recommendations after endoscopy    SUBJECTIVE:  History of Chief Complaint:    Camelia Fontaine is a 27 y.o.male who presents to the emergency room with a complaint of severe right groin pain. In his work-up he was found to be anemic and he has known ulcerative colitis with rectal bleeding since 2013. His hemoglobin this morning was 6.7. He is not being followed by anyone and is under no active treatment. He complains of right groin pain for the last several months that is progressively gotten worse. CAT scan in July showed bilateral inguinal hernias. Scrotal ultrasound was unremarkable other than some small hydroceles. He states the right groin pain is been getting progressively worse. He does a lot of heavy lifting at work. He is been admitted due to his anemia and he is scheduled to have colonoscopy and EGD done tomorrow. He wishes to also consider having his hernia repaired at sometime in the future. Past Medical History   has a past medical history of Aortic stenosis, CAD (coronary artery disease), Diabetes mellitus ([address] Hyperlipidemia, Hypertension, PDA (patent ductus arteriosus), Ulcerative colitis ([address] Vertigo, and Vertigo. Past Surgical History   has a past surgical history that includes Aorta surgery; Patent ductus arterious ligation; and Cardiac surgery. Medications  Prior to Admission medications    Medication Sig Start Date End Date Taking?  Authorizing Provider   ferrous sulfate (IRON 325) 325 (65 Fe) MG tablet Take 1 tablet by mouth 2 tobacco. He reports that he does not drink alcohol or use drugs. Review of Systems:  14 systems were reviewed and negative other than [place]    OBJECTIVE  CURRENT VITALS:  height is 5' 6\" (1.676 m) and weight is 203 lb (92.1 kg). His oral temperature is 96.5 °F (35.8 °C). His blood pressure is 128/69 and his pulse is 77. His respiration is 18 and oxygen saturation is 100%. Temperature Range (24h):Temp: 96.5 °F (35.8 °C) Temp  Av.5 °F (36.4 °C)  Min: 96.5 °F (35.8 °C)  Max: 98.2 °F (36.8 °C)  BP Range (15F): Systolic (98WKW), MRD:645 , Min:121 , VN     Diastolic (41XLS), QEZ:58, Min:69, Max:83    Pulse Range (24h): Pulse  Av  Min: 70  Max: 94  Respiration Range (24h): Resp  Av.3  Min: 16  Max: 18  Current Pulse Ox (24h):  SpO2: 100 %  Pulse Ox Range (24h):  SpO2  Av.3 %  Min: 98 %  Max: 100 %  Oxygen Amount and Delivery:    CONSTITUTIONAL: Alert and oriented times 3, no acute distress and cooperative to examination with proper mood and affect. SKIN: Skin color, texture, turgor normal. No rashes or lesions. , no bruising  LYMPH: no cervical nodes, no inguinal nodes  HEENT: Head is normocephalic, atraumatic. PERRLA, Mucous membranes are moist.   NECK: Supple, symmetrical, trachea midline, no adenopathy, thyroid symmetric, not enlarged and no tenderness, skin normal.  CHEST/LUNGS: chest symmetric with normal A/P diameter, normal respiratory rate and rhythm, lungs clear to auscultation without wheezes, rales or rhonchi. No accessory muscle use. CARDIOVASCULAR: Heart sounds are normal.  Regular rate and rhythm without murmur, gallop or rub. Normal S1 and S2. Carotid and femoral pulses 2+/4 and equal bilaterally. ABDOMEN: Normal shape. .  Normal bowel sounds. No bruits. Soft, nondistended, no masses or organomegaly. Very tender reducible right inguinal hernia. Tenderness: absent. RECTAL: deferred, not clinically indicated  NEUROLOGIC: There are no focalizing motor or sensory deficits.  CN II-XII are grossly intact. Eulalio Founds EXTREMITIES: no cyanosis, no clubbing and no edema. PYSCH:  Mood, affect and judgement are normal, alert and oriented x 3  LABS:  Recent Labs     10/01/20  0745 10/01/20  1108   WBC 7.8  --    HGB 7.1* 6.7*   HCT 24.9* 23.9*   *  --      --    K 4.1  --      --    CO2 24  --    BUN 12  --    CREATININE 0.78  --    CALCIUM 9.1  --    AST 11  --    ALT 10  --    BILITOT 0.3  --    NITRU Negative  --    COLORU Yellow  --        RADIOLOGY:  I have personally reviewed the following films:  CT scan abd/pelvis from 7/8/2020 shows bilateral inguinal hernias. Ultrasound of the scrotum and testicles on 7/8/2020 showed small right hydrocele with epididymitis with no evidence of torsion. Thank you for the interesting evaluation. Further recommendations to follow.     Electronically signed by Davi Bubsy MD on 10/1/20 at 12:37 PM EDT

## 2020-10-01 NOTE — ED TRIAGE NOTES
Pt presents to ED from home with c/o right groin pain. Pt states that he has multiple known hernias and is having extreme pain in his right groin r/t hernias. Pt has been seen multiple times recently for right hip pain and thinks that it may be r/t hernias as well. Pt reports nausea and pain w/urination. Upon assessment, pt is A/Ox4, skin pale/w/d, resp even and unlabored, msp's intact.   Pt denies fever, chills, cp, sob, v/d.

## 2020-10-01 NOTE — ED PROVIDER NOTES
[facility] ENCOUNTER      Pt Name: Yamilet Mac  MRN: [id number]  [place] 1990  Date of evaluation: 10/1/2020  Provider: Michael Jones DO    CHIEF COMPLAINT       Chief Complaint   Patient presents with    Hernia     pt states that he has multiple known hernias and is c/o severe pain in right groin         HISTORY OF PRESENT ILLNESS   (Location/Symptom, Timing/Onset, Context/Setting, Quality, Duration, Modifying Factors, Severity)  Note limiting factors. Jerad Hanna II is a 27 y.o. male who presents to the emergency department . Patient came in with some pain in his right groin. He believes that he has hernias and he has had some pain on and off in the right groin. He has no vomiting and is having normal bowel movements. Patient states that he has been seen here in the past for pain near his right hip. He wonders if the hip pain is hernia pain. Patient is currently homeless and does not have a doctor or insurance. That is why he has never made an appointment for follow-up. HPI    Nursing Notes were reviewed. REVIEW OF SYSTEMS    (2-9 systems for level 4, 10 or more for level 5)     Review of Systems   Constitutional: Positive for fatigue. Negative for activity change, appetite change and fever. HENT: Negative for congestion and sore throat. Eyes: Negative for pain and visual disturbance. Respiratory: Positive for shortness of breath. Negative for chest tightness. Cardiovascular: Negative for chest pain. Gastrointestinal: Positive for abdominal pain. Negative for nausea and vomiting. Endocrine: Negative for polydipsia. Genitourinary: Negative for flank pain and urgency. Musculoskeletal: Negative for gait problem and neck stiffness. Skin: Negative for rash. Neurological: Positive for light-headedness. Negative for weakness and headaches. Psychiatric/Behavioral: Negative for confusion and sleep disturbance.        Except as noted above the remainder of the review of systems was reviewed and negative.        PAST MEDICAL HISTORY     Past Medical History:   Diagnosis Date    Aortic stenosis     CAD (coronary artery disease)     [facility] ([facility] [address]     Hyperlipidemia     Hypertension     PDA (patent ductus arteriosus)     Ulcerative colitis ([facility] [address]     Vertigo     Vertigo 2019         SURGICAL HISTORY       Past Surgical History:   Procedure Laterality Date    AORTA SURGERY      x3    CARDIAC SURGERY      PATENT DUCTUS ARTERIOUS LIGATION           CURRENT MEDICATIONS       Current Discharge Medication List      CONTINUE these medications which have NOT CHANGED    Details   naproxen (NAPROSYN) 500 MG tablet Take 1 tablet by mouth 2 times daily for 7 days  Qty: 14 tablet, Refills: 0    Associated Diagnoses: Right hip pain      cyclobenzaprine (FLEXERIL) 10 MG tablet Take 1 tablet by mouth 3 times daily as needed for Muscle spasms  Qty: 21 tablet, Refills: 0      permethrin (ELIMITE) 5 % cream Apply topically as directed  Qty: 1 Tube, Refills: 0      meloxicam (MOBIC) 15 MG tablet Take 1 tablet by mouth daily for 7 days  Qty: 7 tablet, Refills: 0      dicyclomine (BENTYL) 10 MG capsule Take 1 capsule by mouth every 6 hours as needed (cramps)  Qty: 12 capsule, Refills: 0      ferrous sulfate (IRON 325) 325 (65 Fe) MG tablet Take 1 tablet by mouth 2 times daily  Qty: 60 tablet, Refills: 0      atorvastatin (LIPITOR) 20 MG tablet Take 1 tablet by mouth daily  Qty: 30 tablet, Refills: 5    Associated Diagnoses: Hyperlipidemia, unspecified hyperlipidemia type      meclizine (ANTIVERT) 25 MG tablet Take 1 tablet by mouth every 8 hours as needed for Dizziness  Qty: 120 tablet, Refills: 5    Associated Diagnoses: Vertigo      lisinopril (PRINIVIL;ZESTRIL) 10 MG tablet Take 1 tablet by mouth daily  Qty: 30 tablet, Refills: 5    Associated Diagnoses: Hypertension, unspecified type      metFORMIN (GLUCOPHAGE) 500 MG tablet Take 2 tablets by  Stress: None   Relationships    Social connections     Talks on phone: None     Gets together: None     Attends [employer] service: None     Active member of club or organization: None     Attends meetings of clubs or organizations: None     Relationship status: None    Intimate partner violence     Fear of current or ex partner: None     Emotionally abused: None     Physically abused: None     Forced sexual activity: None   Other Topics Concern    None   Social History Narrative    None       SCREENINGS                        PHYSICAL EXAM    (up to 7 for level 4, 8 or more for level 5)     ED Triage Vitals [10/01/20 0710]   BP Temp Temp Source Pulse Resp SpO2 Height Weight   124/83 98.2 °F (36.8 °C) Temporal 70 17 100 % 5' 6\" (1.676 m) 203 lb (92.1 kg)       Physical Exam  Vitals signs and nursing note reviewed. Constitutional:       General: He is not in acute distress. Appearance: He is well-developed. He is ill-appearing. He is not diaphoretic. HENT:      Head: Normocephalic and atraumatic. Right Ear: External ear normal.      Left Ear: External ear normal.      Mouth/Throat:      Pharynx: No oropharyngeal exudate. Eyes:      Conjunctiva/sclera: Conjunctivae normal.      Pupils: Pupils are equal, round, and reactive to light. Neck:      Musculoskeletal: Normal range of motion and neck supple. Thyroid: No thyromegaly. Vascular: No JVD. Trachea: No tracheal deviation. Cardiovascular:      Rate and Rhythm: Normal rate. Heart sounds: Normal heart sounds. No murmur. Pulmonary:      Effort: Pulmonary effort is normal. No respiratory distress. Breath sounds: Normal breath sounds. No wheezing. Abdominal:      General: Bowel sounds are normal.      Palpations: Abdomen is soft. Tenderness: There is no abdominal tenderness. There is no guarding. Genitourinary:     Rectum: Guaiac result negative. Comments: Rectal heme-negative.   No masses  Musculoskeletal: Normal range of motion. Skin:     General: Skin is warm and dry. Coloration: Skin is pale. Findings: No rash. Neurological:      Mental Status: He is alert and oriented to person, place, and time. Cranial Nerves: No cranial nerve deficit. Psychiatric:         Behavior: Behavior normal.         DIAGNOSTIC RESULTS     EKG: All EKG's are interpreted by the Emergency Department Physician who either signs or Co-signs this chart in the absence of a cardiologist.        RADIOLOGY:   Non-plain film images such as CT, Ultrasound and MRI are read by the radiologist. Plain radiographic images are visualized and preliminarily interpreted by the emergency physician with the below findings:        Interpretation per the Radiologist below, if available at the time of this note:    No orders to display         ED BEDSIDE ULTRASOUND:   Performed by ED Physician - none    LABS:  Labs Reviewed   CBC WITH AUTO DIFFERENTIAL - Abnormal; Notable for the following components:       Result Value    RBC 4.29 (*)     Hemoglobin 7.1 (*)     Hematocrit 24.9 (*)     MCV 58.0 (*)     MCH 16.6 (*)     MCHC 28.6 (*)     RDW 17.8 (*)     Platelets 320 (*)     Monocytes Absolute 0.9 (*)     All other components within normal limits   COMPREHENSIVE METABOLIC PANEL - Abnormal; Notable for the following components:    Anion Gap 8 (*)     Glucose 103 (*)     All other components within normal limits   URINE RT REFLEX TO CULTURE   IRON AND TIBC   FERRITIN   VITAMIN B12 & FOLATE   HEMOGLOBIN AND HEMATOCRIT, BLOOD   HEMOGLOBIN AND HEMATOCRIT, BLOOD   SEDIMENTATION RATE   C-REACTIVE PROTEIN   TYPE AND SCREEN   PREPARE RBC (CROSSMATCH)   ABO/RH       All other labs were within normal range or not returned as of this dictation.     EMERGENCY DEPARTMENT COURSE and DIFFERENTIAL DIAGNOSIS/MDM:   Vitals:    Vitals:    10/01/20 0710 10/01/20 0829   BP: 124/83 121/81   Pulse: 70 71   Resp: 17 16   Temp: 98.2 °F (36.8 °C)    TempSrc: Temporal SpO2: 100% 98%   Weight: 203 lb (92.1 kg)    Height: 5' 6\" (1.676 m)        Patient comes in with right groin pain. I do believe he has an inguinal hernia but it is not incarcerated strangulated or even protruding. I am more concerned with his hemoglobin of 7. I reviewed his records and he had a hemoglobin in the 7 range in July and did not know that. Before that his hemoglobin ranged 13-14. He clearly has low MCV. He most likely has been bleeding chronically or it intermittently to maintain that low hemoglobin with normal vital signs. I spoke to gastroenterology, Dr. Elpidio Neri. We are in agreement that the patient will need to be admitted. He clearly needs upper and lower endoscopy and a blood transfusion. He will not be able to prep if he is homeless. He also will not be able to get follow-up care without a doctor or insurance. I did order 1 unit of packed RBCs while in the ER, IV Protonix. MDM      REASSESSMENT          CRITICAL CARE TIME   Total Critical Care time was 30 minutes, excluding separately reportable procedures. There was a high probability of clinically significant/life threatening deterioration in the patient's condition which required my urgent intervention. CONSULTS:  IP CONSULT TO GI    PROCEDURES:  Unless otherwise noted below, none     Procedures        FINAL IMPRESSION      1. Iron deficiency anemia due to chronic blood loss    2. Homeless    3. Bilateral inguinal hernia without obstruction or gangrene, recurrence not specified          DISPOSITION/PLAN   DISPOSITION Admitted 10/01/2020 08:56:10 AM      PATIENT REFERRED TO:  No follow-up provider specified. DISCHARGE MEDICATIONS:  Current Discharge Medication List        Controlled Substances Monitoring:     No flowsheet data found.     (Please note that portions of this note were completed with a voice recognition program.  Efforts were made to edit the dictations but occasionally words are mis-transcribed.)    Luly Vines Mary Jane Fermin DO (electronically signed)  Attending Emergency Physician            Delphine Avila DO  10/01/20 [id number]

## 2020-10-02 ENCOUNTER — ANESTHESIA (OUTPATIENT)
Dept: ENDOSCOPY | Age: 30
DRG: 394 | End: 2020-10-02

## 2020-10-02 ENCOUNTER — ANESTHESIA EVENT (OUTPATIENT)
Dept: ENDOSCOPY | Age: 30
DRG: 394 | End: 2020-10-02

## 2020-10-02 ENCOUNTER — ANCILLARY PROCEDURE (OUTPATIENT)
Dept: ENDOSCOPY | Age: 30
DRG: 394 | End: 2020-10-02

## 2020-10-02 VITALS
RESPIRATION RATE: 16 BRPM | WEIGHT: 203 LBS | HEIGHT: 66 IN | HEART RATE: 65 BPM | OXYGEN SATURATION: 100 % | BODY MASS INDEX: 32.62 KG/M2 | TEMPERATURE: 97 F | DIASTOLIC BLOOD PRESSURE: 60 MMHG | SYSTOLIC BLOOD PRESSURE: 112 MMHG

## 2020-10-02 VITALS
DIASTOLIC BLOOD PRESSURE: 65 MMHG | OXYGEN SATURATION: 100 % | RESPIRATION RATE: 19 BRPM | SYSTOLIC BLOOD PRESSURE: 113 MMHG

## 2020-10-02 LAB
ALBUMIN SERPL-MCNC: 3.9 G/DL (ref 3.5–4.6)
ANION GAP SERPL CALCULATED.3IONS-SCNC: 11 MEQ/L (ref 9–15)
BUN BLDV-MCNC: 9 MG/DL (ref 6–20)
CALCIUM SERPL-MCNC: 8.5 MG/DL (ref 8.5–9.9)
CHLORIDE BLD-SCNC: 107 MEQ/L (ref 95–107)
CO2: 20 MEQ/L (ref 20–31)
CREAT SERPL-MCNC: 0.75 MG/DL (ref 0.7–1.2)
GFR AFRICAN AMERICAN: >60
GFR NON-AFRICAN AMERICAN: >60
GLUCOSE BLD-MCNC: 104 MG/DL (ref 60–115)
GLUCOSE BLD-MCNC: 105 MG/DL (ref 60–115)
GLUCOSE BLD-MCNC: 136 MG/DL (ref 60–115)
GLUCOSE BLD-MCNC: 92 MG/DL (ref 70–99)
HBA1C MFR BLD: 5.4 % (ref 4.8–5.9)
HCT VFR BLD CALC: 24.8 % (ref 42–52)
HCT VFR BLD CALC: 25.9 % (ref 42–52)
HCT VFR BLD CALC: 26.6 % (ref 42–52)
HEMOGLOBIN: 7.2 G/DL (ref 14–18)
HEMOGLOBIN: 7.5 G/DL (ref 14–18)
HEMOGLOBIN: 7.6 G/DL (ref 14–18)
MAGNESIUM: 1.8 MG/DL (ref 1.7–2.4)
MCH RBC QN AUTO: 17.8 PG (ref 27–31.3)
MCHC RBC AUTO-ENTMCNC: 29 % (ref 33–37)
MCV RBC AUTO: 61.4 FL (ref 80–100)
PDW BLD-RTO: 18.7 % (ref 11.5–14.5)
PERFORMED ON: ABNORMAL
PERFORMED ON: NORMAL
PERFORMED ON: NORMAL
PHOSPHORUS: 2.2 MG/DL (ref 2.3–4.8)
PLATELET # BLD: 415 K/UL (ref 130–400)
POTASSIUM SERPL-SCNC: 4.1 MEQ/L (ref 3.4–4.9)
RBC # BLD: 4.23 M/UL (ref 4.7–6.1)
SODIUM BLD-SCNC: 138 MEQ/L (ref 135–144)
WBC # BLD: 7.7 K/UL (ref 4.8–10.8)

## 2020-10-02 PROCEDURE — 85014 HEMATOCRIT: CPT

## 2020-10-02 PROCEDURE — 45378 DIAGNOSTIC COLONOSCOPY: CPT | Performed by: INTERNAL MEDICINE

## 2020-10-02 PROCEDURE — 83036 HEMOGLOBIN GLYCOSYLATED A1C: CPT

## 2020-10-02 PROCEDURE — 85027 COMPLETE CBC AUTOMATED: CPT

## 2020-10-02 PROCEDURE — 99231 SBSQ HOSP IP/OBS SF/LOW 25: CPT | Performed by: SURGERY

## 2020-10-02 PROCEDURE — 6360000002 HC RX W HCPCS: Performed by: NURSE ANESTHETIST, CERTIFIED REGISTERED

## 2020-10-02 PROCEDURE — C9113 INJ PANTOPRAZOLE SODIUM, VIA: HCPCS | Performed by: INTERNAL MEDICINE

## 2020-10-02 PROCEDURE — 3700000000 HC ANESTHESIA ATTENDED CARE: Performed by: INTERNAL MEDICINE

## 2020-10-02 PROCEDURE — 3609027000 HC COLONOSCOPY: Performed by: INTERNAL MEDICINE

## 2020-10-02 PROCEDURE — 36415 COLL VENOUS BLD VENIPUNCTURE: CPT

## 2020-10-02 PROCEDURE — 6360000002 HC RX W HCPCS: Performed by: INTERNAL MEDICINE

## 2020-10-02 PROCEDURE — 7100000010 HC PHASE II RECOVERY - FIRST 15 MIN: Performed by: INTERNAL MEDICINE

## 2020-10-02 PROCEDURE — 2580000003 HC RX 258: Performed by: INTERNAL MEDICINE

## 2020-10-02 PROCEDURE — 83735 ASSAY OF MAGNESIUM: CPT

## 2020-10-02 PROCEDURE — 0DJD8ZZ INSPECTION OF LOWER INTESTINAL TRACT, VIA NATURAL OR ARTIFICIAL OPENING ENDOSCOPIC: ICD-10-PCS | Performed by: INTERNAL MEDICINE

## 2020-10-02 PROCEDURE — 6370000000 HC RX 637 (ALT 250 FOR IP): Performed by: INTERNAL MEDICINE

## 2020-10-02 PROCEDURE — 3700000001 HC ADD 15 MINUTES (ANESTHESIA): Performed by: INTERNAL MEDICINE

## 2020-10-02 PROCEDURE — 80069 RENAL FUNCTION PANEL: CPT

## 2020-10-02 PROCEDURE — 2709999900 HC NON-CHARGEABLE SUPPLY: Performed by: INTERNAL MEDICINE

## 2020-10-02 PROCEDURE — 2500000003 HC RX 250 WO HCPCS: Performed by: NURSE ANESTHETIST, CERTIFIED REGISTERED

## 2020-10-02 PROCEDURE — 7100000011 HC PHASE II RECOVERY - ADDTL 15 MIN: Performed by: INTERNAL MEDICINE

## 2020-10-02 PROCEDURE — 85018 HEMOGLOBIN: CPT

## 2020-10-02 RX ORDER — MAGNESIUM HYDROXIDE 1200 MG/15ML
LIQUID ORAL PRN
Status: DISCONTINUED | OUTPATIENT
Start: 2020-10-02 | End: 2020-10-02 | Stop reason: ALTCHOICE

## 2020-10-02 RX ORDER — SIMETHICONE 20 MG/.3ML
EMULSION ORAL PRN
Status: DISCONTINUED | OUTPATIENT
Start: 2020-10-02 | End: 2020-10-02 | Stop reason: ALTCHOICE

## 2020-10-02 RX ORDER — LIDOCAINE HYDROCHLORIDE 20 MG/ML
INJECTION, SOLUTION INFILTRATION; PERINEURAL PRN
Status: DISCONTINUED | OUTPATIENT
Start: 2020-10-02 | End: 2020-10-02 | Stop reason: SDUPTHER

## 2020-10-02 RX ORDER — PROPOFOL 10 MG/ML
INJECTION, EMULSION INTRAVENOUS PRN
Status: DISCONTINUED | OUTPATIENT
Start: 2020-10-02 | End: 2020-10-02 | Stop reason: SDUPTHER

## 2020-10-02 RX ADMIN — PROPOFOL 400 MG: 10 INJECTION, EMULSION INTRAVENOUS at 12:35

## 2020-10-02 RX ADMIN — SODIUM CHLORIDE: 9 INJECTION, SOLUTION INTRAVENOUS at 09:47

## 2020-10-02 RX ADMIN — Medication 10 ML: at 09:47

## 2020-10-02 RX ADMIN — LIDOCAINE HYDROCHLORIDE 60 MG: 20 INJECTION, SOLUTION INFILTRATION; PERINEURAL at 12:35

## 2020-10-02 RX ADMIN — SODIUM CHLORIDE 200 MG: 9 INJECTION, SOLUTION INTRAVENOUS at 14:56

## 2020-10-02 RX ADMIN — PANTOPRAZOLE SODIUM 40 MG: 40 INJECTION, POWDER, LYOPHILIZED, FOR SOLUTION INTRAVENOUS at 09:47

## 2020-10-02 ASSESSMENT — PULMONARY FUNCTION TESTS
PIF_VALUE: 0

## 2020-10-02 ASSESSMENT — PAIN - FUNCTIONAL ASSESSMENT: PAIN_FUNCTIONAL_ASSESSMENT: 0-10

## 2020-10-02 ASSESSMENT — PAIN SCALES - GENERAL: PAINLEVEL_OUTOF10: 0

## 2020-10-02 NOTE — PLAN OF CARE
Problem: Infection:  Goal: Will remain free from infection  Description: Will remain free from infection  Outcome: Ongoing     Problem: Safety:  Goal: Free from accidental physical injury  Description: Free from accidental physical injury  Outcome: Ongoing

## 2020-10-02 NOTE — PROGRESS NOTES
Assessment completed. Patient complaining of having pain while trying to urinate. Bladder scanner done, showing 109ml remaining. Dr. Jesse Thornton was made aware and he ordered urinalysis on this patient. Also, patient has completed the first bottle of Moviprep for his procedure tomorrow and tolerated it well. Denies any other needs at this time. Call light is within reach.   Electronically signed by Anyi Damon RN on 10/1/2020 at 10:18 PM

## 2020-10-02 NOTE — PROGRESS NOTES
Hospitalist Progress Note      PCP: Simon Kinney MD    Date of Admission: 10/1/2020    Chief Complaint:  No acute events, afebrile, stable HD, scheduled for endoscopy today per GI    Medications:  Reviewed    Infusion Medications    sodium chloride 75 mL/hr at 10/02/20 0947    dextrose       Scheduled Medications    iron sucrose  200 mg Intravenous Q24H    sodium chloride flush  10 mL Intravenous 2 times per day    pantoprazole  40 mg Intravenous Daily    And    sodium chloride (PF)  10 mL Intravenous Daily    insulin lispro  0-6 Units Subcutaneous TID WC    insulin lispro  0-3 Units Subcutaneous Nightly     PRN Meds: sodium chloride flush, acetaminophen **OR** acetaminophen, polyethylene glycol, promethazine **OR** ondansetron, glucose, dextrose, glucagon (rDNA), dextrose      Intake/Output Summary (Last 24 hours) at 10/2/2020 1209  Last data filed at 10/2/2020 0655  Gross per 24 hour   Intake 3924 ml   Output 150 ml   Net 3774 ml       Exam:    /63   Pulse 84   Temp 97 °F (36.1 °C)   Resp 16   Ht 5' 6\" (1.676 m)   Wt 203 lb (92.1 kg)   SpO2 100%   BMI 32.77 kg/m²     General appearance: appears stated age and cooperative,   Lungs: clear to auscultation bilaterally  Heart: regular rate and rhythm and S1, S2 normal  Abdomen: soft, BS active  Extremities: no edema      Labs:   Recent Labs     10/01/20  0745  10/01/20  1804 10/01/20  2316 10/02/20  0708   WBC 7.8  --   --   --  7.7   HGB 7.1*   < > 7.6* 7.2* 7.5*   HCT 24.9*   < > 26.5* 24.8* 25.9*   *  --   --   --  415*    < > = values in this interval not displayed. Recent Labs     10/01/20  0745 10/02/20  0708    138   K 4.1 4.1    107   CO2 24 20   BUN 12 9   CREATININE 0.78 0.75   CALCIUM 9.1 8.5   PHOS  --  2.2*     Recent Labs     10/01/20  0745   AST 11   ALT 10   BILITOT 0.3   ALKPHOS 63     No results for input(s): INR in the last 72 hours.   No results for input(s): Jadene Moh in the last 72 hours.    Urinalysis:      Lab Results   Component Value Date    NITRU Negative 10/01/2020    BLOODU Negative 10/01/2020    SPECGRAV 1.022 10/01/2020    GLUCOSEU Negative 10/01/2020       Radiology:  No orders to display           Assessment/Plan:    27 y.o. male with PMH of PDA s/p closure, congenital subaortic stenosis s/p resection x 2, bicuspid aortic valve, HTN, UC, DM2, microcytic anemia, inguinal hernia who presented with:     Acute / chronic blood loss anemia   - in the setting of untreated ulcerative colitis, NSAIDS  - continue IV Protonix,   - stop NSAIDS  - s/p transfusion of one unit of PRBCs  - IV iron daily  - s/p colonoscopy that showed only internal hemorrhoids per GI service     Right groin pain  - ongoing for the last few months,   - CT of the abd/pelvis on 7/8 showed small bilateral hernias,   - surgical service following     DM2  - ISS, hypoglycemia protocol     HTN  - hold BP meds   - monitor BP                  Electronically signed by Kelli Platt MD on 10/2/2020 at 12:09 PM

## 2020-10-02 NOTE — PROGRESS NOTES
Pt Name: Denisa Ashford II  Medical Record Number: [id number]  Date of Birth 1990   Admit date 10/1/2020  7:13 AM  Today's Date: 10/2/2020     ASSESSMENT  1. Hospital day # 1  2. Right inguinal hernia    PLAN  1. Hernia to be fixed on an elective basis. We will plan to see him 1 week after discharge    SUBJECTIVE  Chief complaint: Right groin pain  Afebrile, vital signs are stable. He denies any nausea or vomiting, has not passed flatus or had a bowel movement. He is tolerating a Diet NPO, After Midnight. His pain is well controlled on current medications. He has been ambulating in the halls. Endoscopy today. has a past medical history of Aortic stenosis, CAD (coronary artery disease), Diabetes mellitus ([facility] [address] Hyperlipidemia, Hypertension, PDA (patent ductus arteriosus), Ulcerative colitis ([facility] [address] Vertigo, and Vertigo. CURRENT MEDS  Scheduled Meds:   sodium chloride flush  10 mL Intravenous 2 times per day    pantoprazole  40 mg Intravenous Daily    And    sodium chloride (PF)  10 mL Intravenous Daily    insulin lispro  0-6 Units Subcutaneous TID WC    insulin lispro  0-3 Units Subcutaneous Nightly     Continuous Infusions:   sodium chloride 75 mL/hr at 10/02/20 0947    dextrose       PRN Meds:.sodium chloride flush, acetaminophen **OR** acetaminophen, polyethylene glycol, promethazine **OR** ondansetron, glucose, dextrose, glucagon (rDNA), dextrose    OBJECTIVE  CURRENT VITALS:  height is 5' 6\" (1.676 m) and weight is 203 lb (92.1 kg). His oral temperature is 97.7 °F (36.5 °C). His blood pressure is 119/70 and his pulse is 80. His respiration is 17 and oxygen saturation is 100%.    Temperature Range (24h):Temp: 97.7 °F (36.5 °C) Temp  Av.5 °F (36.4 °C)  Min: 96.5 °F (35.8 °C)  Max: 98.7 °F (37.1 °C)  BP Range (15A): Systolic (51IKF), WTZ:656 , Min:113 , LJF:314     Diastolic (87WKS), QOM:16, Min:54, Max:75    Pulse Range (24h): Pulse  Av.2  Min: 73  Max: 94  Respiration Range (24h): Resp  Av.8  Min: 17  Max: 18    GENERAL: alert, no distress  LUNGS: clear to ausculation, without wheezes, rales or rhonci  HEART: normal rate and regular rhythm  ABDOMEN: soft, non-tender, non-distended, bowel sounds present in all 4 quadrants and no guarding or peritoneal signs, tender nonincarcerated right inguinal hernia  EXTERMITY: no cyanosis, clubbing or edema  In: 3924 [P.O.:2300; I.V.:1624]  Out: 150 [Urine:150]  Date 10/02/20 0000 - 10/02/20 2359   Shift 4220-9293 0161-5021 8030-4861 24 Hour Total   INTAKE   P.O.(mL/kg/hr) 2300(3.1)   2300   I. V.(mL/kg) 3219(95.1)   4519(36.7)   Shift Total(mL/kg) 3606(57.4)   3924(42.6)   OUTPUT   Urine(mL/kg/hr) 150(0.2)   150   Shift Total(mL/kg) 150(1.6)   150(1.6)   Weight (kg) 92.1 92.1 92.1 92.1       LABS  Recent Labs     10/01/20  0745  10/01/20  1804 10/01/20  2316 10/02/20  0708   WBC 7.8  --   --   --  7.7   HGB 7.1*   < > 7.6* 7.2* 7.5*   HCT 24.9*   < > 26.5* 24.8* 25.9*   *  --   --   --  415*     --   --   --  138   K 4.1  --   --   --  4.1     --   --   --  107   CO2 24  --   --   --  20   BUN 12  --   --   --  9   CREATININE 0.78  --   --   --  0.75   MG  --   --   --   --  1.8   PHOS  --   --   --   --  2.2*   CALCIUM 9.1  --   --   --  8.5    < > = values in this interval not displayed. No results for input(s): PTT, INR in the last 72 hours. Invalid input(s): PT  Recent Labs     10/01/20  0745   AST 11   ALT 10   BILITOT 0.3       RADIOLOGY  Xr Hip Right (2-3 Views)    Result Date: 2020  EXAMINATION: RIGHT HIP  CLINICAL HISTORY: Pain no history of trauma reported COMPARISON: 2005  FINDINGS: Two views are  submitted. No acute fractures. No dislocations. No focal bony abnormalities                                                                                   NO ACUTE FRACTURE.     Ct Hip Right Wo Contrast    Result Date: 2020  CT HIP RIGHT WO CONTRAST : 2020 CLINICAL HISTORY: pain x 3 weeks with no acute injury . COMPARISON: Pelvis and right hip radiographs 9/16/2020 and CT abdomen and pelvis with contrast 7/8/2020. TECHNIQUE: Spiral unenhanced imaging was obtained of the right hip, with routine multiplanar reconstructions performed. All CT scans at this facility use dose modulation, iterative reconstruction, and/or weight based dosing when appropriate to reduce radiation dose to as low as reasonably achievable. FINDINGS: There is no fracture, dislocation, significant joint effusion, radiographic evidence of avascular necrosis, acute or significant change from the prior studies identified. The right hip and sacroiliac joints appear within normal limits. Small fat-containing inguinal hernias are again noted. ESSENTIALLY NEGATIVE RIGHT HIP CT.      Electronically signed by Ruddy Cook MD on 10/2/2020 at 10:55 AM

## 2020-10-02 NOTE — PLAN OF CARE
Problem: Infection:  Goal: Will remain free from infection  Description: Will remain free from infection  Outcome: Ongoing     Problem: Safety:  Goal: Free from accidental physical injury  Description: Free from accidental physical injury  Outcome: Ongoing  Goal: Free from intentional harm  Description: Free from intentional harm  Outcome: Ongoing     Problem: Discharge Planning:  Goal: Patients continuum of care needs are met  Description: Patients continuum of care needs are met  Outcome: Ongoing

## 2020-10-02 NOTE — CARE COORDINATION
[facility] Case Management Initial Discharge Assessment    Met with Patient to discuss discharge plan. PCP: Caryle Lint, MD                                Date of Last Visit: over a year ago    If no PCP, list provided? Declined  [facility] APPLICATION AND PHAMPLET GIVEN    Discharge Planning    Living Arrangements: homeless \"LIVING IN A HOTEL\"      Who do you live with? GIRLFRIEND    Who helps you with your care:  self    If lives at home:     Do you have any barriers navigating in your home? Patient can perform ADL? Yes    Current Services (outpatient and in home) :  None    Dialysis: No    Is transportation available to get to your appointments? Yes HAS A CAR WORKS    DME Equipment:  no    Respiratory equipment: None    Respiratory provider:  no     Pharmacy:  yes - [facility] \"HAS THE CHEAPEST MED'S FOR ME\"    Consult with Medication Assistance Program?  Yes  PER JULIOCESAR PT' STATES HE DOES NOT QUAILIFY FOR THIS VISIT \"MAKES TOO MUCH MONEY\"    Patient agreeable to [facility]? N/A    Patient agreeable to SNF/Rehab? N/A    Other discharge needs identified? Other PHAMPLET ON [facility] ALONG WITH APPLICATION    Freedom of choice list provided with basic dialogue that supports the patient's individualized plan of care/goals and shares the quality data associated with the providers. N/A    Does Patient Have a High-Risk for Readmission Diagnosis (CHF, PN, MI, COPD)? No    The plan for Transition of Care is related to the following treatment goals:  TO DETERMINE WHAT HAS TO BE DONE WITH HERNIA    Initial Discharge Plan?  D/C TO HOTEL PHONE NUMBER THAT [facility] PROVIDED FOR PT TO CALL FOR ASSISTANCE WITH [facility] [phone], HE WILL CALL BUT CURRENTLY AT A HOTEL AND JUST MAY WANT TO STAY THERE      The Patient and/or patient representative: Isidoro Awan was provided with choice of any post-acute providers for care and equipment and agrees with discharge plan  Yes    Electronically signed by Rey Barker RN on 10/2/2020 at 4:04 PM

## 2020-10-02 NOTE — ANESTHESIA POSTPROCEDURE EVALUATION
Department of Anesthesiology  Postprocedure Note    Patient: Yee Lindo  MRN: [id number]  YOB: 1990  Date of evaluation: 10/2/2020  Time:  12:54 PM     Procedure Summary     Date:  10/02/20 Room / Location:  [facility] OR  / [facility]    Anesthesia Start:  1231 Anesthesia Stop:      Procedure:  COLONOSCOPY DIAGNOSTIC (N/A ) Diagnosis:  (Anemia  D64.9)    Surgeon:  725 Andie Barber MD Responsible Provider:  NICOLE Avalos - JUSTINA    Anesthesia Type:  MAC ASA Status:  3          Anesthesia Type: MAC    Hung Phase I:      Hung Phase II:      Last vitals: Reviewed and per EMR flowsheets.        Anesthesia Post Evaluation    Patient location during evaluation: bedside  Patient participation: complete - patient participated  Level of consciousness: awake and awake and alert  Pain score: 0  Airway patency: patent  Nausea & Vomiting: no nausea and no vomiting  Complications: no  Cardiovascular status: blood pressure returned to baseline and hemodynamically stable  Respiratory status: acceptable and spontaneous ventilation  Hydration status: euvolemic

## 2020-10-02 NOTE — ANESTHESIA PRE PROCEDURE
Department of Anesthesiology  Preprocedure Note       Name:  Deidre Callejas II   Age:  27 y.o.  :  1990                                          MRN:  [id number]         Date:  10/2/2020      Surgeon: Donna Gutierrez):  Luis Rios MD    Procedure: Procedure(s):  COLONOSCOPY DIAGNOSTIC    Medications prior to admission:   Prior to Admission medications    Medication Sig Start Date End Date Taking? Authorizing Provider   ferrous sulfate (IRON 325) 325 (65 Fe) MG tablet Take 1 tablet by mouth 2 times daily 20   Yari Hickman DO   atorvastatin (LIPITOR) 20 MG tablet Take 1 tablet by mouth daily  Patient taking differently: Take 20 mg by mouth daily Hasn't taken for over a month. Medication in storage 19  Juany Jeffries MD   meclizine (ANTIVERT) 25 MG tablet Take 1 tablet by mouth every 8 hours as needed for Dizziness 19   Juany Jeffries MD   lisinopril (PRINIVIL;ZESTRIL) 10 MG tablet Take 1 tablet by mouth daily 19   Juany Jeffries MD   metFORMIN (GLUCOPHAGE) 500 MG tablet Take 2 tablets by mouth 2 times daily (with meals)  Patient taking differently: Take 500 mg by mouth 2 times daily (with meals) Pt hasn't taken in over a month, cost is a problem 19  Juany Jeffries MD   glucose monitoring kit (FREESTYLE) monitoring kit 1 kit by Does not apply route daily 19   Juany Jeffries MD   blood glucose monitor strips Test once a day & as needed for symptoms of irregular blood glucose.  19   Juany Jeffries MD   Lancets MISC 1 each by Does not apply route daily 19   Juany Jeffries MD       Current medications:    Current Facility-Administered Medications   Medication Dose Route Frequency Provider Last Rate Last Dose    sodium chloride flush 0.9 % injection 10 mL  10 mL Intravenous 2 times per day Raimundo Willoughby MD   10 mL at 10/01/20 2028    sodium chloride flush 0.9 % injection 10 mL  10 mL Intravenous PRN Raimundo Willuoghby MD        acetaminophen (TYLENOL) tablet 650 mg 650 mg Oral Q6H PRN Zhanna Anderson MD        Or    acetaminophen (TYLENOL) suppository 650 mg  650 mg Rectal Q6H PRN Zhanna Anderson MD        polyethylene glycol (GLYCOLAX) packet 17 g  17 g Oral Daily PRN Zhanna Anderson MD        promethazine (PHENERGAN) tablet 12.5 mg  12.5 mg Oral Q6H PRN Zhanna Anderson MD        Or    ondansetron (ZOFRAN) injection 4 mg  4 mg Intravenous Q6H PRN Zhanna Anderson MD        0.9 % sodium chloride infusion   Intravenous Continuous Zhanna Anderson MD 75 mL/hr at 10/01/20 2028      pantoprazole (PROTONIX) injection 40 mg  40 mg Intravenous Daily Zhanna Anderson MD        And    sodium chloride (PF) 0.9 % injection 10 mL  10 mL Intravenous Daily Zhanna Anderson MD   Stopped at 10/01/20 1230    glucose (GLUTOSE) 40 % oral gel 15 g  15 g Oral PRN Zhanna Anderson MD        dextrose 50 % IV solution  12.5 g Intravenous PRN Zhanna Anderson MD        glucagon (rDNA) injection 1 mg  1 mg Intramuscular PRN Zhanna Anderson MD        dextrose 5 % solution  100 mL/hr Intravenous PRN Zhanna Anderson MD        insulin lispro (HUMALOG) injection vial 0-6 Units  0-6 Units Subcutaneous TID WC Zhanna Anderson MD   Stopped at 10/01/20 1838    insulin lispro (HUMALOG) injection vial 0-3 Units  0-3 Units Subcutaneous Nightly Zhanna Anderson MD           Allergies:     Allergies   Allergen Reactions    Latex Anaphylaxis       Problem List:    Patient Active Problem List   Diagnosis Code    Ulcerative colitis ([facility] K51.90    PDA (patent ductus arteriosus) Q25.0    Hypertension I10    Hyperlipidemia E78.5    [facility] E11.9    CAD (coronary artery disease) I25.10    Aortic stenosis I35.0    Vertigo R42    Anemia D64.9       Past Medical History:        Diagnosis Date    Aortic stenosis     CAD (coronary artery disease)     [facility]     Hyperlipidemia     Hypertension     PDA (patent ductus arteriosus)     Ulcerative colitis ([facility]     Vertigo     Vertigo 2019 Past Surgical History:        Procedure Laterality Date    AORTA SURGERY      x3    CARDIAC SURGERY      PATENT DUCTUS ARTERIOUS LIGATION         Social History:    Social History     Tobacco Use    Smoking status: Never Smoker    Smokeless tobacco: Never Used   Substance Use Topics    Alcohol use: No                                Counseling given: Not Answered      Vital Signs (Current):   Vitals:    10/01/20 1324 10/01/20 1432 10/01/20 2024 10/02/20 0727   BP: (!) 113/54 123/75 122/60 119/70   Pulse: 73 77 86 80   Resp: 18 18 18 17   Temp: 36.2 °C (97.1 °F) 36.3 °C (97.4 °F) 37.1 °C (98.7 °F) 36.5 °C (97.7 °F)   TempSrc: Oral Oral Oral Oral   SpO2: 100% 100% 100% 100%   Weight:       Height:                                                  BP Readings from Last 3 Encounters:   10/02/20 119/70   09/25/20 134/73   09/16/20 (!) 117/57       NPO Status:                                                                                 BMI:   Wt Readings from Last 3 Encounters:   10/01/20 203 lb (92.1 kg)   09/25/20 203 lb (92.1 kg)   09/16/20 200 lb (90.7 kg)     Body mass index is 32.77 kg/m².     CBC:   Lab Results   Component Value Date    WBC 7.7 10/02/2020    RBC 4.23 10/02/2020    RBC 5.15 04/28/2012    HGB 7.5 10/02/2020    HCT 25.9 10/02/2020    MCV 61.4 10/02/2020    RDW 18.7 10/02/2020     10/02/2020       CMP:   Lab Results   Component Value Date     10/02/2020    K 4.1 10/02/2020     10/02/2020    CO2 20 10/02/2020    BUN 9 10/02/2020    CREATININE 0.75 10/02/2020    GFRAA >60.0 10/02/2020    LABGLOM >60.0 10/02/2020    GLUCOSE 92 10/02/2020    GLUCOSE 102 04/28/2012    PROT 7.8 10/01/2020    CALCIUM 8.5 10/02/2020    BILITOT 0.3 10/01/2020    ALKPHOS 63 10/01/2020    AST 11 10/01/2020    ALT 10 10/01/2020       POC Tests:   Recent Labs     10/02/20  0725   POCGLU 105       Coags:   Lab Results   Component Value Date    PROTIME 13.9 07/08/2020    PROTIME 11.3 04/28/2012    INR 1.1 07/08/2020    APTT 28.2 07/08/2020       HCG (If Applicable): No results found for: PREGTESTUR, PREGSERUM, HCG, HCGQUANT     ABGs: No results found for: PHART, PO2ART, FCR8HUY, RNV3CBE, BEART, F7WOTRNX     Type & Screen (If Applicable):  No results found for: LABABO, LABRH    Drug/Infectious Status (If Applicable):  No results found for: HIV, HEPCAB    COVID-19 Screening (If Applicable):   Lab Results   Component Value Date    COVID19 Not Detected 04/28/2020         Anesthesia Evaluation    Airway: Mallampati: II  TM distance: >3 FB   Neck ROM: full  Mouth opening: > = 3 FB Dental:          Pulmonary:Negative Pulmonary ROS and normal exam                               Cardiovascular:    (+) hypertension:, CAD:, hyperlipidemia        Rhythm: regular  Rate: normal                    Neuro/Psych:   Negative Neuro/Psych ROS              GI/Hepatic/Renal:   (+) PUD, bowel prep,           Endo/Other:    (+) Diabetes, blood dyscrasia: anemia:., .                 Abdominal:           Vascular: negative vascular ROS. Anesthesia Plan      MAC     ASA 3       Induction: intravenous. Anesthetic plan and risks discussed with patient. Plan discussed with attending.                   NICOLE Finch CRNA   10/2/2020

## 2020-10-02 NOTE — PROGRESS NOTES
Second Moviprep set up for patient to begin drinking.   Electronically signed by Mu Soni RN on 10/2/2020 at 4:38 AM

## 2020-10-05 ENCOUNTER — TELEPHONE (OUTPATIENT)
Dept: GASTROENTEROLOGY | Age: 30
End: 2020-10-05

## 2020-10-05 NOTE — DISCHARGE SUMMARY
Hospital Medicine Discharge Summary    Romina Carbajal II  :  1990  MRN:  [id number]    Admit date:  10/1/2020  Discharge date:  10/2/2020    Admitting Physician:  Ramon De La Torre MD  Primary Care Physician:  Kassi Rivera MD      Discharge Diagnoses: Active Problems:    Anemia    Rectal bleeding    Blood loss anemia  Resolved Problems:    * No resolved hospital problems.  *      Hospital Course:   Romina Carbajal II is a 27 y.o. male that was admitted and treated at [facility] for the following medical issues:     Acute / chronic blood loss anemia   - treated with continue IV Protonix,   - s/p transfusion of one unit of PRBCs and IV iron   - colonoscopy showed only internal hemorrhoids per GI service with plan for outpatient EGD  - patient left AMA      Patient was seen by the following consultants while admitted to [facility]:   Consults:  [id number]    Significant Diagnostic Studies:    Colonsocopy(10/2/2020)        Disposition: left AMA        Signed:  Belvie Najjar  10/5/2020, 1:39 PM  ----------------------------------------------------------------------------------------------------------------------

## 2020-10-05 NOTE — TELEPHONE ENCOUNTER
The patient scheduled for an EGD on 10/13/20 with Dr. Latricia Vazquez and a Covid Test on 10/8/20. I do not see an order for the procedure.

## 2020-10-06 LAB
C. TRACHOMATIS DNA ,URINE: NEGATIVE
N. GONORRHOEAE DNA, URINE: NEGATIVE

## 2020-10-07 ENCOUNTER — NURSE ONLY (OUTPATIENT)
Dept: PRIMARY CARE CLINIC | Age: 30
End: 2020-10-07

## 2020-10-07 DIAGNOSIS — K62.5 RECTAL BLEEDING: ICD-10-CM

## 2020-10-09 LAB
SARS-COV-2: NOT DETECTED
SOURCE: NORMAL

## 2020-10-13 ENCOUNTER — ANESTHESIA (OUTPATIENT)
Dept: ENDOSCOPY | Age: 30
End: 2020-10-13

## 2020-10-13 ENCOUNTER — ANCILLARY PROCEDURE (OUTPATIENT)
Dept: ENDOSCOPY | Age: 30
End: 2020-10-13
Attending: INTERNAL MEDICINE

## 2020-10-13 ENCOUNTER — HOSPITAL ENCOUNTER (OUTPATIENT)
Age: 30
Setting detail: OUTPATIENT SURGERY
Discharge: HOME | End: 2020-10-13
Attending: INTERNAL MEDICINE | Admitting: INTERNAL MEDICINE
Payer: COMMERCIAL

## 2020-10-13 ENCOUNTER — ANESTHESIA EVENT (OUTPATIENT)
Dept: ENDOSCOPY | Age: 30
End: 2020-10-13

## 2020-10-13 VITALS
HEIGHT: 66 IN | WEIGHT: 203 LBS | SYSTOLIC BLOOD PRESSURE: 106 MMHG | BODY MASS INDEX: 32.62 KG/M2 | RESPIRATION RATE: 16 BRPM | TEMPERATURE: 98.4 F | OXYGEN SATURATION: 99 % | DIASTOLIC BLOOD PRESSURE: 61 MMHG | HEART RATE: 80 BPM

## 2020-10-13 VITALS — DIASTOLIC BLOOD PRESSURE: 84 MMHG | OXYGEN SATURATION: 97 % | SYSTOLIC BLOOD PRESSURE: 140 MMHG

## 2020-10-13 LAB
GLUCOSE BLD-MCNC: 113 MG/DL (ref 60–115)
PERFORMED ON: NORMAL

## 2020-10-13 PROCEDURE — 6370000000 HC RX 637 (ALT 250 FOR IP): Performed by: INTERNAL MEDICINE

## 2020-10-13 PROCEDURE — 3609017100 HC EGD: Performed by: INTERNAL MEDICINE

## 2020-10-13 PROCEDURE — 6360000002 HC RX W HCPCS: Performed by: NURSE ANESTHETIST, CERTIFIED REGISTERED

## 2020-10-13 PROCEDURE — 2500000003 HC RX 250 WO HCPCS: Performed by: NURSE ANESTHETIST, CERTIFIED REGISTERED

## 2020-10-13 PROCEDURE — 88305 TISSUE EXAM BY PATHOLOGIST: CPT

## 2020-10-13 PROCEDURE — 3700000001 HC ADD 15 MINUTES (ANESTHESIA): Performed by: INTERNAL MEDICINE

## 2020-10-13 PROCEDURE — 3700000000 HC ANESTHESIA ATTENDED CARE: Performed by: INTERNAL MEDICINE

## 2020-10-13 PROCEDURE — 2709999900 HC NON-CHARGEABLE SUPPLY: Performed by: INTERNAL MEDICINE

## 2020-10-13 PROCEDURE — 88342 IMHCHEM/IMCYTCHM 1ST ANTB: CPT

## 2020-10-13 PROCEDURE — 2580000003 HC RX 258: Performed by: INTERNAL MEDICINE

## 2020-10-13 PROCEDURE — 43239 EGD BIOPSY SINGLE/MULTIPLE: CPT | Performed by: INTERNAL MEDICINE

## 2020-10-13 PROCEDURE — 7100000010 HC PHASE II RECOVERY - FIRST 15 MIN: Performed by: INTERNAL MEDICINE

## 2020-10-13 PROCEDURE — 2580000003 HC RX 258

## 2020-10-13 RX ORDER — 0.9 % SODIUM CHLORIDE 0.9 %
500 INTRAVENOUS SOLUTION INTRAVENOUS ONCE
Status: COMPLETED | OUTPATIENT
Start: 2020-10-13 | End: 2020-10-13

## 2020-10-13 RX ORDER — MAGNESIUM HYDROXIDE 1200 MG/15ML
LIQUID ORAL PRN
Status: DISCONTINUED | OUTPATIENT
Start: 2020-10-13 | End: 2020-10-13 | Stop reason: ALTCHOICE

## 2020-10-13 RX ORDER — LIDOCAINE HYDROCHLORIDE 20 MG/ML
INJECTION, SOLUTION INFILTRATION; PERINEURAL PRN
Status: DISCONTINUED | OUTPATIENT
Start: 2020-10-13 | End: 2020-10-13 | Stop reason: SDUPTHER

## 2020-10-13 RX ORDER — SODIUM CHLORIDE 9 MG/ML
INJECTION, SOLUTION INTRAVENOUS
Status: COMPLETED
Start: 2020-10-13 | End: 2020-10-13

## 2020-10-13 RX ORDER — SIMETHICONE 20 MG/.3ML
EMULSION ORAL PRN
Status: DISCONTINUED | OUTPATIENT
Start: 2020-10-13 | End: 2020-10-13 | Stop reason: ALTCHOICE

## 2020-10-13 RX ORDER — ONDANSETRON 2 MG/ML
4 INJECTION INTRAMUSCULAR; INTRAVENOUS
Status: DISCONTINUED | OUTPATIENT
Start: 2020-10-13 | End: 2020-10-13 | Stop reason: HOSPADM

## 2020-10-13 RX ORDER — PROPOFOL 10 MG/ML
INJECTION, EMULSION INTRAVENOUS PRN
Status: DISCONTINUED | OUTPATIENT
Start: 2020-10-13 | End: 2020-10-13 | Stop reason: SDUPTHER

## 2020-10-13 RX ADMIN — SODIUM CHLORIDE 200 ML: 9 INJECTION, SOLUTION INTRAVENOUS at 08:37

## 2020-10-13 RX ADMIN — PROPOFOL 50 MG: 10 INJECTION, EMULSION INTRAVENOUS at 08:31

## 2020-10-13 RX ADMIN — SODIUM CHLORIDE 500 ML: 9 INJECTION, SOLUTION INTRAVENOUS at 07:47

## 2020-10-13 RX ADMIN — PROPOFOL 50 MG: 10 INJECTION, EMULSION INTRAVENOUS at 08:30

## 2020-10-13 RX ADMIN — PROPOFOL 50 MG: 10 INJECTION, EMULSION INTRAVENOUS at 08:34

## 2020-10-13 RX ADMIN — PROPOFOL 50 MG: 10 INJECTION, EMULSION INTRAVENOUS at 08:32

## 2020-10-13 RX ADMIN — PROPOFOL 50 MG: 10 INJECTION, EMULSION INTRAVENOUS at 08:33

## 2020-10-13 RX ADMIN — LIDOCAINE HYDROCHLORIDE 40 MG: 20 INJECTION, SOLUTION INFILTRATION; PERINEURAL at 08:29

## 2020-10-13 RX ADMIN — PROPOFOL 70 MG: 10 INJECTION, EMULSION INTRAVENOUS at 08:29

## 2020-10-13 ASSESSMENT — PULMONARY FUNCTION TESTS
PIF_VALUE: 0
PIF_VALUE: 0
PIF_VALUE: 1
PIF_VALUE: 0
PIF_VALUE: 0
PIF_VALUE: 1
PIF_VALUE: 0

## 2020-10-13 ASSESSMENT — PAIN - FUNCTIONAL ASSESSMENT: PAIN_FUNCTIONAL_ASSESSMENT: 0-10

## 2020-10-13 NOTE — H&P
Patient Name: Pilar Rico II  : 1990  MRN: [id number]  DATE: 10/13/20      ENDOSCOPY  History and Physical    Procedure:    [] Diagnostic Colonoscopy       [] Screening Colonoscopy  [x] EGD      [] ERCP      [] EUS       [] Other    [x] Previous office notes/History and Physical reviewed from the patients chart. Please see EMR for further details of HPI. I have examined the patient's status immediately prior to the procedure and:      Indications/HPI:    []Abdominal Pain   []Cancer- GI/Lung  []Fhx of colon CA  []History of Polyps   []Crs   []Melena  []Abnormal Imaging   []Dysphagia    []Persistent Pneumonia  [x]Anemia   []Food Impaction  []History of Polyps  []GI Bleed   []Pulmonary nodule/Mass  []Change in bowel habits  []Heartburn/Reflux  []Rectal Bleed (BRBPR)  []Chest Pain - Non Cardiac  []Heme (+) Stool  []Ulcers  []Constipation   []Hemoptysis   []Varices  []Diarrhea   []Hypoxemia  []Nausea/Vomiting   []Screening   []Crohns/Colitis  []Other:    Anesthesia:   [x] MAC [] Moderate Sedation   [] General   [] None     ROS: 12 pt Review of Symptoms was negative unless mentioned above    Medications:   Prior to Admission medications    Medication Sig Start Date End Date Taking? Authorizing Provider   ferrous sulfate (IRON 325) 325 (65 Fe) MG tablet Take 1 tablet by mouth 2 times daily 20   August Hickman DO   atorvastatin (LIPITOR) 20 MG tablet Take 1 tablet by mouth daily  Patient taking differently: Take 20 mg by mouth daily Hasn't taken for over a month.  Medication in storage 19  Faith Meza MD   meclizine (ANTIVERT) 25 MG tablet Take 1 tablet by mouth every 8 hours as needed for Dizziness 19   Faith Meza MD   lisinopril (PRINIVIL;ZESTRIL) 10 MG tablet Take 1 tablet by mouth daily 19   Faith Meza MD   metFORMIN (GLUCOPHAGE) 500 MG tablet Take 2 tablets by mouth 2 times daily (with meals)  Patient taking differently: Take 500 mg by mouth 2 times daily (with meals) Pt hasn't taken in over a month, cost is a problem 6/13/19 1/1/20  Jhon Allen MD   glucose monitoring kit (FREESTYLE) monitoring kit 1 kit by Does not apply route daily 6/13/19   Jhon Allen MD   blood glucose monitor strips Test once a day & as needed for symptoms of irregular blood glucose. 6/13/19   Jhon Allen MD   Lancets MISC 1 each by Does not apply route daily 6/13/19   Jhon Allen MD     Allergies: Allergies   Allergen Reactions    Latex Anaphylaxis      History of allergic reaction to anesthesia:  No    Past Medical History:  Past Medical History:   Diagnosis Date    Aortic stenosis     CAD (coronary artery disease)     Diabetes mellitus ([facility] [address]     History of blood transfusion     Hyperlipidemia     Hypertension     PDA (patent ductus arteriosus)     Ulcerative colitis ([facility] [address]     Vertigo     Vertigo 2019     Past Surgical History:  Past Surgical History:   Procedure Laterality Date    AORTA SURGERY      x3    CARDIAC SURGERY      COLONOSCOPY N/A 10/2/2020    COLONOSCOPY DIAGNOSTIC performed by Jessy Castro MD at [facility]      PATENT DUCTUS ARTERIOUS LIGATION       Social History:  Social History     Tobacco Use    Smoking status: Never Smoker    Smokeless tobacco: Never Used   Substance Use Topics    Alcohol use: No    Drug use: No     Vital Signs:   Vitals:    10/13/20 0736   BP: 135/79   Pulse: 86   Resp: 16   Temp: 98.4 °F (36.9 °C)   SpO2: 100%      Physical Exam:  Cardiac:  [x]WNL []Comments:  Pulmonary:  [x]WNL   []Comments:   Neuro/Mental Status:  [x]WNL  []Comments:  Abdominal:  [x]WNL    []Comments:  Other:   []WNL  []Comments:    Informed Consent:  The risks and benefits of the procedure have been discussed with either the patient or if they cannot consent, their representative. Assessment:  Patient examined and appropriate for planned sedation and procedure.      Plan:  Proceed with planned sedation and procedure as above. The patient was counseled at length about risks of xiao COVID-19 in the perioperative and any recovery window from the procedure. The patient was made aware that xiao COVID-19 may worsen their prognosis for recovery from their procedure and lend to a higher morbidity and-all mortality risk. The patient was given the option of postponing the procedure all material risks, benefits, and alternatives were discussed. The patient does wish to proceed with the procedure at this time.     Denisa Vee MD  7:57 AM

## 2020-10-13 NOTE — ANESTHESIA PRE PROCEDURE
Kali Gongora MD   20 mg at 10/13/20 0738    ondansetron (ZOFRAN) injection 4 mg  4 mg Intravenous Once PRN Kali Gongora MD           Allergies: Allergies   Allergen Reactions    Latex Anaphylaxis       Problem List:    Patient Active Problem List   Diagnosis Code    Ulcerative colitis ([facility] [address] K51.90    PDA (patent ductus arteriosus) Q25.0    Hypertension I10    Hyperlipidemia E78.5    Diabetes mellitus ([facility] [address] E11.9    CAD (coronary artery disease) I25.10    Aortic stenosis I35.0    Vertigo R42    Anemia D64.9    Rectal bleeding K62.5    Blood loss anemia D50.0       Past Medical History:        Diagnosis Date    Aortic stenosis     CAD (coronary artery disease)     Diabetes mellitus ([facility] [address]     History of blood transfusion     Hyperlipidemia     Hypertension     PDA (patent ductus arteriosus)     Ulcerative colitis ([facility] [address]     Vertigo     Vertigo 2019       Past Surgical History:        Procedure Laterality Date    AORTA SURGERY      x3    CARDIAC SURGERY      COLONOSCOPY N/A 10/2/2020    COLONOSCOPY DIAGNOSTIC performed by Kali Gongora MD at [address] [facility]      PATENT DUCTUS ARTERIOUS LIGATION         Social History:    Social History     Tobacco Use    Smoking status: Never Smoker    Smokeless tobacco: Never Used   Substance Use Topics    Alcohol use:  No                                Counseling given: Not Answered      Vital Signs (Current):   Vitals:    10/13/20 0736   BP: 135/79   Pulse: 86   Resp: 16   Temp: 36.9 °C (98.4 °F)   TempSrc: Temporal   SpO2: 100%   Weight: 203 lb (92.1 kg)   Height: 5' 6\" (1.676 m)                                              BP Readings from Last 3 Encounters:   10/13/20 135/79   10/02/20 112/60   10/02/20 113/65       NPO Status: Time of last liquid consumption: 2100                        Time of last solid consumption: 2000                        Date of last liquid consumption: 10/12/20 Date of last solid food consumption: 10/12/20    BMI:   Wt Readings from Last 3 Encounters:   10/13/20 203 lb (92.1 kg)   10/02/20 203 lb (92.1 kg)   09/25/20 203 lb (92.1 kg)     Body mass index is 32.77 kg/m². CBC:   Lab Results   Component Value Date    WBC 7.7 10/02/2020    RBC 4.23 10/02/2020    RBC 5.15 04/28/2012    HGB 7.6 10/02/2020    HCT 26.6 10/02/2020    MCV 61.4 10/02/2020    RDW 18.7 10/02/2020     10/02/2020       CMP:   Lab Results   Component Value Date     10/02/2020    K 4.1 10/02/2020     10/02/2020    CO2 20 10/02/2020    BUN 9 10/02/2020    CREATININE 0.75 10/02/2020    GFRAA >60.0 10/02/2020    LABGLOM >60.0 10/02/2020    GLUCOSE 92 10/02/2020    GLUCOSE 102 04/28/2012    PROT 7.8 10/01/2020    CALCIUM 8.5 10/02/2020    BILITOT 0.3 10/01/2020    ALKPHOS 63 10/01/2020    AST 11 10/01/2020    ALT 10 10/01/2020       POC Tests: No results for input(s): POCGLU, POCNA, POCK, POCCL, POCBUN, POCHEMO, POCHCT in the last 72 hours. Coags:   Lab Results   Component Value Date    PROTIME 13.9 07/08/2020    PROTIME 11.3 04/28/2012    INR 1.1 07/08/2020    APTT 28.2 07/08/2020       HCG (If Applicable): No results found for: PREGTESTUR, PREGSERUM, HCG, HCGQUANT     ABGs: No results found for: PHART, PO2ART, MYX5EXM, XOA5NCH, BEART, W7FUJUPJ     Type & Screen (If Applicable):  No results found for: LABABO, LABRH    Drug/Infectious Status (If Applicable):  No results found for: HIV, HEPCAB    COVID-19 Screening (If Applicable):   Lab Results   Component Value Date    COVID19 Not Detected 10/07/2020         Anesthesia Evaluation  Patient summary reviewed and Nursing notes reviewed  Airway: Mallampati: II  TM distance: >3 FB   Neck ROM: full  Mouth opening: > = 3 FB Dental: normal exam         Pulmonary:Negative Pulmonary ROS and normal exam              Patient did not smoke on day of surgery.                  Cardiovascular:    (+) hypertension:, CAD:, hyperlipidemia Beta Blocker:  Not on Beta Blocker      ROS comment: PDA     Neuro/Psych:   Negative Neuro/Psych ROS              GI/Hepatic/Renal:   (+) PUD,          ROS comment: Ulcerative colitis  anemia. Endo/Other:    (+) Diabetes, . Pt had no PAT visit       Abdominal:   (+) obese,     Abdomen: soft. Vascular: negative vascular ROS. Anesthesia Plan      MAC     ASA 2       Induction: intravenous. Anesthetic plan and risks discussed with patient.         Attending anesthesiologist reviewed and agrees with Pre Eval content              NICOLE Regan - CRNA   10/13/2020

## 2020-10-13 NOTE — ANESTHESIA POSTPROCEDURE EVALUATION
Department of Anesthesiology  Postprocedure Note    Patient: Reina Whalen  MRN: [id number]  YOB: 1990  Date of evaluation: 10/13/2020  Time:  8:47 AM     Procedure Summary     Date:  10/13/20 Room / Location:  [address] [place] [address] [facility]    Anesthesia Start:  0223 Anesthesia Stop:  6508    Procedure:  EGD ESOPHAGOGASTRODUODENOSCOPY (N/A ) Diagnosis:  (rectal bleeding  K62.5)    Surgeon:  Teja Martínez MD Responsible Provider:  NICOLE Rader CRNA    Anesthesia Type:  MAC ASA Status:  2          Anesthesia Type: MAC    Hung Phase I: Hung Score: 10    Hung Phase II:      Last vitals: Reviewed and per EMR flowsheets.        Anesthesia Post Evaluation    Patient location during evaluation: PACU  Patient participation: waiting for patient participation  Level of consciousness: sleepy but conscious  Pain score: 0  Airway patency: patent  Nausea & Vomiting: no nausea and no vomiting  Complications: no  Cardiovascular status: blood pressure returned to baseline and hemodynamically stable  Respiratory status: acceptable  Hydration status: euvolemic

## 2020-11-01 ENCOUNTER — HOSPITAL ENCOUNTER (EMERGENCY)
Age: 30
Discharge: HOME | End: 2020-11-01
Payer: COMMERCIAL

## 2020-11-01 ENCOUNTER — APPOINTMENT (OUTPATIENT)
Dept: GENERAL RADIOLOGY | Age: 30
End: 2020-11-01

## 2020-11-01 ENCOUNTER — APPOINTMENT (OUTPATIENT)
Dept: CT IMAGING | Age: 30
End: 2020-11-01

## 2020-11-01 VITALS
HEART RATE: 89 BPM | BODY MASS INDEX: 31.66 KG/M2 | RESPIRATION RATE: 19 BRPM | TEMPERATURE: 98.1 F | SYSTOLIC BLOOD PRESSURE: 146 MMHG | WEIGHT: 197 LBS | DIASTOLIC BLOOD PRESSURE: 83 MMHG | HEIGHT: 66 IN | OXYGEN SATURATION: 98 %

## 2020-11-01 LAB
ALBUMIN SERPL-MCNC: 4.4 G/DL (ref 3.5–4.6)
ALP BLD-CCNC: 65 U/L (ref 35–104)
ALT SERPL-CCNC: 10 U/L (ref 0–41)
AMPHETAMINE SCREEN, URINE: NORMAL
ANION GAP SERPL CALCULATED.3IONS-SCNC: 7 MEQ/L (ref 9–15)
ANISOCYTOSIS: ABNORMAL
AST SERPL-CCNC: 11 U/L (ref 0–40)
BARBITURATE SCREEN URINE: NORMAL
BASOPHILS ABSOLUTE: 0 K/UL (ref 0–0.2)
BASOPHILS RELATIVE PERCENT: 0.3 %
BENZODIAZEPINE SCREEN, URINE: NORMAL
BILIRUB SERPL-MCNC: <0.2 MG/DL (ref 0.2–0.7)
BILIRUBIN URINE: NEGATIVE
BLOOD, URINE: NEGATIVE
BUN BLDV-MCNC: 13 MG/DL (ref 6–20)
CALCIUM SERPL-MCNC: 8.9 MG/DL (ref 8.5–9.9)
CANNABINOID SCREEN URINE: NORMAL
CHLORIDE BLD-SCNC: 106 MEQ/L (ref 95–107)
CLARITY: CLEAR
CO2: 24 MEQ/L (ref 20–31)
COCAINE METABOLITE SCREEN URINE: NORMAL
COLOR: YELLOW
CREAT SERPL-MCNC: 0.76 MG/DL (ref 0.7–1.2)
D DIMER: 0.28 MG/L FEU (ref 0–0.5)
EKG ATRIAL RATE: 326 BPM
EKG P AXIS: 49 DEGREES
EKG Q-T INTERVAL: 354 MS
EKG QRS DURATION: 102 MS
EKG QTC CALCULATION (BAZETT): 440 MS
EKG R AXIS: 42 DEGREES
EKG T AXIS: 53 DEGREES
EKG VENTRICULAR RATE: 93 BPM
EOSINOPHILS ABSOLUTE: 1 K/UL (ref 0–0.7)
EOSINOPHILS RELATIVE PERCENT: 5 %
GFR AFRICAN AMERICAN: >60
GFR NON-AFRICAN AMERICAN: >60
GLOBULIN: 2.8 G/DL (ref 2.3–3.5)
GLUCOSE BLD-MCNC: 147 MG/DL (ref 70–99)
GLUCOSE URINE: NEGATIVE MG/DL
HCT VFR BLD CALC: 27.7 % (ref 42–52)
HEMOGLOBIN: 8.3 G/DL (ref 14–18)
HYPOCHROMIA: ABNORMAL
INFLUENZA A BY PCR: NEGATIVE
INFLUENZA B BY PCR: NEGATIVE
KETONES, URINE: NEGATIVE MG/DL
LACTIC ACID: 1.9 MMOL/L (ref 0.5–2.2)
LEUKOCYTE ESTERASE, URINE: NEGATIVE
LYMPHOCYTES ABSOLUTE: 1.5 K/UL (ref 1–4.8)
LYMPHOCYTES RELATIVE PERCENT: 8 %
Lab: NORMAL
MAGNESIUM: 1.8 MG/DL (ref 1.7–2.4)
MCH RBC QN AUTO: 19.6 PG (ref 27–31.3)
MCHC RBC AUTO-ENTMCNC: 29.9 % (ref 33–37)
MCV RBC AUTO: 65.6 FL (ref 80–100)
METHADONE SCREEN, URINE: NORMAL
MICROCYTES: ABNORMAL
MONOCYTES ABSOLUTE: 1.2 K/UL (ref 0.2–0.8)
MONOCYTES RELATIVE PERCENT: 5.9 %
NEUTROPHILS ABSOLUTE: 15.6 K/UL (ref 1.4–6.5)
NEUTROPHILS RELATIVE PERCENT: 81 %
NITRITE, URINE: NEGATIVE
OPIATE SCREEN URINE: NORMAL
OXYCODONE URINE: NORMAL
PDW BLD-RTO: 25.8 % (ref 11.5–14.5)
PH UA: 5 (ref 5–9)
PHENCYCLIDINE SCREEN URINE: NORMAL
PLATELET # BLD: 441 K/UL (ref 130–400)
PLATELET SLIDE REVIEW: ABNORMAL
POIKILOCYTES: ABNORMAL
POTASSIUM SERPL-SCNC: 4 MEQ/L (ref 3.4–4.9)
PROPOXYPHENE SCREEN: NORMAL
PROTEIN UA: NEGATIVE MG/DL
RBC # BLD: 4.22 M/UL (ref 4.7–6.1)
SARS-COV-2, NAAT: NOT DETECTED
SODIUM BLD-SCNC: 137 MEQ/L (ref 135–144)
SPECIFIC GRAVITY UA: 1.03 (ref 1–1.03)
STREP GRP A PCR: NEGATIVE
TOTAL PROTEIN: 7.2 G/DL (ref 6.3–8)
TROPONIN: <0.01 NG/ML (ref 0–0.01)
URINE REFLEX TO CULTURE: NORMAL
UROBILINOGEN, URINE: 0.2 E.U./DL
WBC # BLD: 19.2 K/UL (ref 4.8–10.8)

## 2020-11-01 PROCEDURE — 85379 FIBRIN DEGRADATION QUANT: CPT

## 2020-11-01 PROCEDURE — 96367 TX/PROPH/DG ADDL SEQ IV INF: CPT

## 2020-11-01 PROCEDURE — 2580000003 HC RX 258: Performed by: STUDENT IN AN ORGANIZED HEALTH CARE EDUCATION/TRAINING PROGRAM

## 2020-11-01 PROCEDURE — 71250 CT THORAX DX C-: CPT

## 2020-11-01 PROCEDURE — 80053 COMPREHEN METABOLIC PANEL: CPT

## 2020-11-01 PROCEDURE — 81003 URINALYSIS AUTO W/O SCOPE: CPT

## 2020-11-01 PROCEDURE — 6370000000 HC RX 637 (ALT 250 FOR IP): Performed by: STUDENT IN AN ORGANIZED HEALTH CARE EDUCATION/TRAINING PROGRAM

## 2020-11-01 PROCEDURE — 87502 INFLUENZA DNA AMP PROBE: CPT

## 2020-11-01 PROCEDURE — 6360000002 HC RX W HCPCS: Performed by: STUDENT IN AN ORGANIZED HEALTH CARE EDUCATION/TRAINING PROGRAM

## 2020-11-01 PROCEDURE — 87651 STREP A DNA AMP PROBE: CPT

## 2020-11-01 PROCEDURE — 87040 BLOOD CULTURE FOR BACTERIA: CPT

## 2020-11-01 PROCEDURE — U0002 COVID-19 LAB TEST NON-CDC: HCPCS

## 2020-11-01 PROCEDURE — 93005 ELECTROCARDIOGRAM TRACING: CPT | Performed by: STUDENT IN AN ORGANIZED HEALTH CARE EDUCATION/TRAINING PROGRAM

## 2020-11-01 PROCEDURE — 96365 THER/PROPH/DIAG IV INF INIT: CPT

## 2020-11-01 PROCEDURE — 84484 ASSAY OF TROPONIN QUANT: CPT

## 2020-11-01 PROCEDURE — 36415 COLL VENOUS BLD VENIPUNCTURE: CPT

## 2020-11-01 PROCEDURE — 94640 AIRWAY INHALATION TREATMENT: CPT

## 2020-11-01 PROCEDURE — 71045 X-RAY EXAM CHEST 1 VIEW: CPT

## 2020-11-01 PROCEDURE — 99285 EMERGENCY DEPT VISIT HI MDM: CPT

## 2020-11-01 PROCEDURE — 85025 COMPLETE CBC W/AUTO DIFF WBC: CPT

## 2020-11-01 PROCEDURE — 83735 ASSAY OF MAGNESIUM: CPT

## 2020-11-01 PROCEDURE — 83605 ASSAY OF LACTIC ACID: CPT

## 2020-11-01 PROCEDURE — 80307 DRUG TEST PRSMV CHEM ANLYZR: CPT

## 2020-11-01 RX ORDER — PREDNISONE 20 MG/1
40 TABLET ORAL ONCE
Status: COMPLETED | OUTPATIENT
Start: 2020-11-01 | End: 2020-11-01

## 2020-11-01 RX ORDER — BENZONATATE 100 MG/1
100-200 CAPSULE ORAL 3 TIMES DAILY PRN
Qty: 42 CAPSULE | Refills: 0 | Status: SHIPPED | OUTPATIENT
Start: 2020-11-01 | End: 2020-11-08

## 2020-11-01 RX ORDER — AMOXICILLIN AND CLAVULANATE POTASSIUM 875; 125 MG/1; MG/1
1 TABLET, FILM COATED ORAL 2 TIMES DAILY
Qty: 20 TABLET | Refills: 0 | Status: SHIPPED | OUTPATIENT
Start: 2020-11-01 | End: 2020-11-11

## 2020-11-01 RX ORDER — ALBUTEROL SULFATE 90 UG/1
2 AEROSOL, METERED RESPIRATORY (INHALATION) 4 TIMES DAILY PRN
Qty: 1 INHALER | Refills: 0 | Status: SHIPPED | OUTPATIENT
Start: 2020-11-01

## 2020-11-01 RX ORDER — AZITHROMYCIN 250 MG/1
250 TABLET, FILM COATED ORAL SEE ADMIN INSTRUCTIONS
Qty: 6 TABLET | Refills: 0 | Status: SHIPPED | OUTPATIENT
Start: 2020-11-01 | End: 2020-11-06

## 2020-11-01 RX ORDER — ALBUTEROL SULFATE 2.5 MG/3ML
2.5 SOLUTION RESPIRATORY (INHALATION) ONCE
Status: COMPLETED | OUTPATIENT
Start: 2020-11-01 | End: 2020-11-01

## 2020-11-01 RX ORDER — GUAIFENESIN 600 MG/1
600 TABLET, EXTENDED RELEASE ORAL 2 TIMES DAILY
Qty: 30 TABLET | Refills: 0 | Status: SHIPPED | OUTPATIENT
Start: 2020-11-01 | End: 2020-11-16

## 2020-11-01 RX ORDER — 0.9 % SODIUM CHLORIDE 0.9 %
1000 INTRAVENOUS SOLUTION INTRAVENOUS ONCE
Status: COMPLETED | OUTPATIENT
Start: 2020-11-01 | End: 2020-11-01

## 2020-11-01 RX ADMIN — SODIUM CHLORIDE 1000 ML: 9 INJECTION, SOLUTION INTRAVENOUS at 02:37

## 2020-11-01 RX ADMIN — ALBUTEROL SULFATE 2.5 MG: 2.5 SOLUTION RESPIRATORY (INHALATION) at 02:04

## 2020-11-01 RX ADMIN — CEFTRIAXONE SODIUM 1 G: 1 INJECTION, POWDER, FOR SOLUTION INTRAMUSCULAR; INTRAVENOUS at 05:55

## 2020-11-01 RX ADMIN — PREDNISONE 40 MG: 20 TABLET ORAL at 01:47

## 2020-11-01 RX ADMIN — AZITHROMYCIN MONOHYDRATE 500 MG: 500 INJECTION, POWDER, LYOPHILIZED, FOR SOLUTION INTRAVENOUS at 04:14

## 2020-11-01 ASSESSMENT — PAIN DESCRIPTION - PAIN TYPE: TYPE: ACUTE PAIN

## 2020-11-01 ASSESSMENT — PAIN DESCRIPTION - LOCATION: LOCATION: CHEST

## 2020-11-01 ASSESSMENT — PAIN SCALES - GENERAL: PAINLEVEL_OUTOF10: 2

## 2020-11-01 NOTE — ED NOTES
Labs obtained by this RN, labeled and sent to lab via tube system.       Clifford Johnson RN  11/01/20 9426

## 2020-11-01 NOTE — ED NOTES
Add on labs collected by this RN, labeled, and  sent to lab via tube system.      Timo Ku RN  11/01/20 6982

## 2020-11-01 NOTE — ED TRIAGE NOTES
Patient states since yesterday he has had an increase in difficulty breathing. Patient has a dry cough, nasal drip that is clear. Denies N/V, diarrhea. His vital signs are stable. He is alert and orientated x 4. Pink, warm and dry. Afebrile. Ambulated to bed 2 with a steady gait and no incident. Girl friend is at bedside.

## 2020-11-01 NOTE — ED NOTES
[facility]       Margreta Simmonds, RN  11/01/20 8826

## 2020-11-02 PROCEDURE — 93010 ELECTROCARDIOGRAM REPORT: CPT | Performed by: INTERNAL MEDICINE

## 2020-11-02 ASSESSMENT — ENCOUNTER SYMPTOMS
VOMITING: 0
COUGH: 1
SINUS PAIN: 0
BLOOD IN STOOL: 0
DIARRHEA: 0
PHOTOPHOBIA: 0
SORE THROAT: 0
NAUSEA: 0
VOICE CHANGE: 0
SINUS PRESSURE: 0
CONSTIPATION: 0
ABDOMINAL PAIN: 0
SHORTNESS OF BREATH: 1
RHINORRHEA: 0
WHEEZING: 0
TROUBLE SWALLOWING: 0

## 2020-11-02 NOTE — ED PROVIDER NOTES
[facility] ED  EMERGENCY DEPARTMENT ENCOUNTER      Pt Name: Lyndon Khan  MRN: [id number]  Bradleygftamika 1990  Date of evaluation: 11/1/2020  Provider: Corina Cordero Dr       Chief Complaint   Patient presents with    Shortness of Breath         HISTORY OF PRESENT ILLNESS   (Location/Symptom, Timing/Onset, Context/Setting, Quality, Duration, Modifying Factors, Severity)  Note limiting factors. Kajal Richardson II is a 27 y.o. male who prescribed his past medical history of you COPD hypertension hyperlipidemia type 2 diabetes CAD vertigo blood loss anemia presents to the emergency department with gradual onset constant moderate dry nonbloody cough, postnasal drip, and shortness of breath x1 day. He states that shortness of breath is worse when he is sitting still and better when he moves around. Patient states that he has had 2 negative Covid tests so far last being 1 month ago. No known exposures to Covid. No sick other sick contacts. Patient is currently living with his girlfriend. He has been trying over-the-counter medications with no relief. He states he slept with his window open the other day and thinks that is contributing to his symptoms. He denies chest pain fever chills nausea vomiting diarrhea abdominal pain urinary symptoms leg swelling or tenderness hemoptysis myalgia loss of taste or smell. Patient was recently admitted due to anemia requiring transfusions thought to be secondary to ulcerative colitis. He had endoscopy and colonoscopy which were negative. He states sx have improved since that time. HPI    Nursing Notes were reviewed. REVIEW OF SYSTEMS    (2-9 systems for level 4, 10 or more for level 5)     Review of Systems   Constitutional: Negative for chills and fever. HENT: Positive for postnasal drip.  Negative for congestion, drooling, ear discharge, ear pain, rhinorrhea, sinus pressure, sinus pain, sore throat, tinnitus, trouble swallowing and voice change. Eyes: Negative for photophobia. Respiratory: Positive for cough and shortness of breath. Negative for wheezing. Cardiovascular: Negative for chest pain, palpitations and leg swelling. Gastrointestinal: Negative for abdominal pain, blood in stool, constipation, diarrhea, nausea and vomiting. Genitourinary: Negative for dysuria, frequency and hematuria. Musculoskeletal: Negative for myalgias. Allergic/Immunologic: Negative for immunocompromised state. Neurological: Negative for dizziness, weakness and headaches. All other systems reviewed and are negative. Except as noted above the remainder of the review of systems was reviewed and negative.        PAST MEDICAL HISTORY     Past Medical History:   Diagnosis Date    Aortic stenosis     CAD (coronary artery disease)     Diabetes mellitus ([facility] [address]     History of blood transfusion     Hyperlipidemia     Hypertension     PDA (patent ductus arteriosus)     Ulcerative colitis ([facility] [address]     Vertigo     Vertigo 2019         SURGICAL HISTORY       Past Surgical History:   Procedure Laterality Date    AORTA SURGERY      x3    CARDIAC SURGERY      COLONOSCOPY N/A 10/2/2020    COLONOSCOPY DIAGNOSTIC performed by Teja Martínez MD at [address] [facility]      PATENT DUCTUS ARTERIOUS LIGATION      UPPER GASTROINTESTINAL ENDOSCOPY N/A 10/13/2020    EGD ESOPHAGOGASTRODUODENOSCOPY performed by Teja Martínez MD at [facility]       Discharge Medication List as of 11/1/2020  5:44 AM      CONTINUE these medications which have NOT CHANGED    Details   ferrous sulfate (IRON 325) 325 (65 Fe) MG tablet Take 1 tablet by mouth 2 times daily, Disp-60 tablet, R-0Print      atorvastatin (LIPITOR) 20 MG tablet Take 1 tablet by mouth daily, Disp-30 tablet, R-5Normal      meclizine (ANTIVERT) 25 MG tablet Take 1 tablet by mouth every 8 hours as needed for Dizziness, Disp-120 Concern    None   Social History Narrative    None       SCREENINGS                        PHYSICAL EXAM    (up to 7 for level 4, 8 or more for level 5)     ED Triage Vitals   BP Temp Temp Source Pulse Resp SpO2 Height Weight   11/01/20 0110 11/01/20 0105 11/01/20 0105 11/01/20 0105 11/01/20 0105 11/01/20 0105 11/01/20 0105 11/01/20 0105   121/66 98.1 °F (36.7 °C) Tympanic 98 18 98 % 5' 6\" (1.676 m) 197 lb (89.4 kg)       Physical Exam  Constitutional:       General: He is not in acute distress. Appearance: He is well-developed. He is not ill-appearing, toxic-appearing or diaphoretic. Comments: Pale    HENT:      Head: Normocephalic and atraumatic. Nose: Nose normal.      Mouth/Throat:      Mouth: Mucous membranes are moist.   Eyes:      Pupils: Pupils are equal, round, and reactive to light. Neck:      Musculoskeletal: Normal range of motion. Cardiovascular:      Rate and Rhythm: Normal rate and regular rhythm. Pulses: Normal pulses. Heart sounds: No murmur. No friction rub. No gallop. Pulmonary:      Effort: Pulmonary effort is normal.      Breath sounds: Normal breath sounds. No wheezing, rhonchi or rales. Abdominal:      General: Bowel sounds are normal. There is no distension. Palpations: Abdomen is soft. Tenderness: There is no abdominal tenderness. There is no guarding or rebound. Musculoskeletal:         General: No swelling. Right lower leg: No edema. Left lower leg: No edema. Skin:     General: Skin is warm and dry. Capillary Refill: Capillary refill takes less than 2 seconds. Findings: No rash. Neurological:      General: No focal deficit present. Mental Status: He is alert and oriented to person, place, and time.          DIAGNOSTIC RESULTS     EKG: All EKG's are interpreted by the Emergency Department Physician who either signs or Co-signs this chart in the absence of a cardiologist.    EKG shows NSR with HR 91, normal axis, normal intervals, no ST changes. No changes compared to 6/2020. EKG read states atrial flutter however reviewed with ED attending Dr. Juliano Ferrara and we both agree this is not accurate. RADIOLOGY:   Non-plain film images such as CT, Ultrasound and MRI are read by the radiologist. Plain radiographic images are visualized and preliminarily interpreted by the emergency physician with the below findings:      Interpretation per the Radiologist below, if available at the time of this note:    RADIOLOGY REPORT   Final Result      CT CHEST WO CONTRAST   Final Result      Commonly reported imaging features of October 19 pneumonia present. Other processes such as influenza pneumonia and organizing pneumonia, such as can be seen in toxicity, and connective tissue disease, and cause a similar imaging pattern. All CT scans at this facility use dose modulation, iterative reconstruction, and/or weight based dosing when appropriate to reduce radiation dose to as low as reasonably achievable. XR CHEST PORTABLE   Final Result   NO ACUTE CARDIOPULMONARY DISEASE.             ED BEDSIDE ULTRASOUND:   Performed by ED Physician - none    LABS:  Labs Reviewed   COMPREHENSIVE METABOLIC PANEL - Abnormal; Notable for the following components:       Result Value    Anion Gap 7 (*)     Glucose 147 (*)     All other components within normal limits   CBC WITH AUTO DIFFERENTIAL - Abnormal; Notable for the following components:    WBC 19.2 (*)     RBC 4.22 (*)     Hemoglobin 8.3 (*)     Hematocrit 27.7 (*)     MCV 65.6 (*)     MCH 19.6 (*)     MCHC 29.9 (*)     RDW 25.8 (*)     Platelets 903 (*)     Neutrophils Absolute 15.6 (*)     Monocytes Absolute 1.2 (*)     Eosinophils Absolute 1.0 (*)     All other components within normal limits   RAPID INFLUENZA A/B ANTIGENS   RAPID STREP SCREEN   CULTURE, BLOOD 2    Narrative:     ORDER#: [id number]                          ORDERED BY: NEEL BAUGH  SOURCE: Blood COLLECTED:  11/01/20 04:28  ANTIBIOTICS AT AUSTYN.:                      RECEIVED :  11/01/20 04:28   CULTURE, BLOOD 1    Narrative:     ORDER#: [id number]                          ORDERED BY: NEEL BAUGH  SOURCE: Blood                              COLLECTED:  11/01/20 03:09  ANTIBIOTICS AT AUSTYN.:                      RECEIVED :  11/01/20 03:09   TROPONIN   COVID-19   MAGNESIUM   D-DIMER, QUANTITATIVE   URINE RT REFLEX TO CULTURE   LACTIC ACID, PLASMA   URINE DRUG SCREEN       All other labs were within normal range or not returned as of this dictation. EMERGENCY DEPARTMENT COURSE and DIFFERENTIAL DIAGNOSIS/MDM:   Vitals:    Vitals:    11/01/20 0330 11/01/20 0413 11/01/20 0557 11/01/20 0615   BP: (!) 143/73 (!) 142/72 (!) 141/84 (!) 146/83   Pulse: 96 101 103 89   Resp: 21 20 20 19   Temp:       TempSrc:       SpO2: 99% 100% 98% 98%   Weight:       Height:           MDM     Patient is a 43-year-old male who presents to the ED with cough shortness of breath and postnasal drip. He is afebrile and hemodynamically stable. He was given 1 L IV normal saline p.o. prednisone and albuterol nebs in the ED. EKG shows NSR with HR 91, normal axis, normal intervals, no ST changes. No changes compared to 6/2020. EKG read states atrial flutter however reviewed with ED attending Dr. Laila Tavarez and we both agree this is not accurate. CBC remarkable for white count of 19.2 and anemia with hemoglobin of 8.3 which is stable from prior. Remainder of labs are unremarkable. Covid swab is negative. UA is negative for UTI. Rapid flu and strep are negative. Blood cultures are pending. Portable chest x-ray is remarkable for bilateral infiltrates. CT of the chest shows right lung subsegmental atelectatic change and groundglass opacity anterior base right middle lobe. There is groundglass opacity identified laterally and posteriorly from the base of the right lower lobe. These findings can be seen with COVID-19 pneumonia.   Other processes such as influenza pneumonia and organizing pneumonia can cause a similar pattern. Patient was given IV Rocephin and is better at this time. Patient reassessed and states he is feeling completely better. He states he wants to go home. I did offer the patient observation admission however he declined. He was given scripts for albuterol inhaler Augmentin and azithromycin Tessalon and Mucinex. He will follow-up with primary care in 1 day. Explained the importance of close follow-up as patient declined an observation admission. He is to return to the ED immediately for worsening symptoms. He was given warning signs for which she should return. Patient understands and agrees to plan. All questions answered. REASSESSMENT          CRITICAL CARE TIME   Total Critical Care time was 0 minutes, excluding separately reportable procedures. There was a high probability of clinically significant/life threatening deterioration in the patient's condition which required my urgent intervention. CONSULTS:  None    PROCEDURES:  Unless otherwise noted below, none     Procedures        FINAL IMPRESSION      1.  Pneumonia of both lungs due to infectious organism, unspecified part of lung          DISPOSITION/PLAN   DISPOSITION Decision To Discharge 11/01/2020 05:42:19 AM      PATIENT REFERRED TO:  Angelito Kerr MD  [address] [address] [facility]  [phone]    Schedule an appointment as soon as possible for a visit in 1 day      [facility]) ED  [address] [facility]  [phone]  Go to   As needed, If symptoms worsen    Carin Sawyer MD  [address] [address] [facility]  [phone]    Schedule an appointment as soon as possible for a visit in 1 day        DISCHARGE MEDICATIONS:  Discharge Medication List as of 11/1/2020  5:44 AM      START taking these medications    Details   amoxicillin-clavulanate (AUGMENTIN) 875-125 MG per tablet Take 1 tablet by mouth 2 times daily for 10 days, Disp-20 tablet,R-0Print      albuterol sulfate HFA (VENTOLIN HFA) 108 (90 Base) MCG/ACT inhaler Inhale 2 puffs into the lungs 4 times daily as needed for Wheezing, Disp-1 Inhaler,R-0Print      benzonatate (TESSALON) 100 MG capsule Take 1-2 capsules by mouth 3 times daily as needed for Cough, Disp-42 capsule,R-0Print      guaiFENesin (MUCINEX) 600 MG extended release tablet Take 1 tablet by mouth 2 times daily for 15 days, Disp-30 tablet,R-0Print      azithromycin (ZITHROMAX) 250 MG tablet Take 1 tablet by mouth See Admin Instructions for 5 days 500mg on day 1 followed by 250mg on days 2 - 5, Disp-6 tablet,R-0Print           Controlled Substances Monitoring:     No flowsheet data found.     (Please note that portions of this note were completed with a voice recognition program.  Efforts were made to edit the dictations but occasionally words are mis-transcribed.)    Marlon De La Cruz PA-C (electronically signed)             Marlon De La Cruz PA-C  11/02/20 0411       Marlon De La Cruz PA-C  11/28/20 0991

## 2020-11-06 LAB
BLOOD CULTURE, ROUTINE: NORMAL
CULTURE, BLOOD 2: NORMAL

## 2024-06-05 NOTE — ED TRIAGE NOTES
Pt reports that he developed groin pain yesterday and has no improvement pt states that he has no issues urinating but the pain is getting worse pt alert ambulated to triage with a steady gate Standing/Walking

## (undated) DEVICE — COVER DUST PERIMETER HUMPREY

## (undated) DEVICE — TUBE SET 96 MM 64 MM H2O PERISTALTIC STD AUX CHANNEL

## (undated) DEVICE — FORCEPS BX L240CM JAW DIA2.4MM ORNG L CAP W/ NDL DISP RAD

## (undated) DEVICE — ADAPTER FLSH PMP FLD MGMT GI IRRIG OFP 2 DISPOSABLE

## (undated) DEVICE — ENDO CARRY-ON PROCEDURE KIT: Brand: ENDO CARRY-ON PROCEDURE KIT

## (undated) DEVICE — TUBING, SUCTION, 1/4" X 10', STRAIGHT: Brand: MEDLINE

## (undated) DEVICE — BRUSH ENDO CLN L90.5IN SHTH DIA1.7MM BRIST DIA5-7MM 2-6MM

## (undated) DEVICE — SINGLE PORT MANIFOLD: Brand: NEPTUNE 2

## (undated) DEVICE — CONMED SCOPE SAVER BITE BLOCK, 20X27 MM: Brand: SCOPE SAVER

## (undated) DEVICE — Device: Brand: ENDO SMARTCAP